# Patient Record
Sex: MALE | Race: WHITE | Employment: UNEMPLOYED | URBAN - METROPOLITAN AREA
[De-identification: names, ages, dates, MRNs, and addresses within clinical notes are randomized per-mention and may not be internally consistent; named-entity substitution may affect disease eponyms.]

---

## 2019-03-13 ENCOUNTER — OFFICE VISIT (OUTPATIENT)
Dept: FAMILY MEDICINE CLINIC | Age: 49
End: 2019-03-13

## 2019-03-13 VITALS
BODY MASS INDEX: 37.37 KG/M2 | HEART RATE: 99 BPM | WEIGHT: 261 LBS | TEMPERATURE: 97.9 F | SYSTOLIC BLOOD PRESSURE: 145 MMHG | HEIGHT: 70 IN | DIASTOLIC BLOOD PRESSURE: 114 MMHG | OXYGEN SATURATION: 96 % | RESPIRATION RATE: 16 BRPM

## 2019-03-13 DIAGNOSIS — E78.2 MIXED HYPERLIPIDEMIA: ICD-10-CM

## 2019-03-13 DIAGNOSIS — I10 HYPERTENSION GOAL BP (BLOOD PRESSURE) < 140/90: ICD-10-CM

## 2019-03-13 DIAGNOSIS — F32.A DEPRESSION, UNSPECIFIED DEPRESSION TYPE: ICD-10-CM

## 2019-03-13 DIAGNOSIS — D49.9 PRECANCEROUS LESION: ICD-10-CM

## 2019-03-13 DIAGNOSIS — Z00.00 ROUTINE MEDICAL EXAM: Primary | ICD-10-CM

## 2019-03-13 DIAGNOSIS — R06.83 SNORING: ICD-10-CM

## 2019-03-13 DIAGNOSIS — Z76.89 SLEEP CONCERN: ICD-10-CM

## 2019-03-13 DIAGNOSIS — E66.01 OBESITY, CLASS III, BMI 40-49.9 (MORBID OBESITY) (HCC): ICD-10-CM

## 2019-03-13 RX ORDER — PIMECROLIMUS 10 MG/G
1 CREAM TOPICAL 2 TIMES DAILY
COMMUNITY
End: 2019-11-26

## 2019-03-13 RX ORDER — AMLODIPINE BESYLATE 5 MG/1
5 TABLET ORAL DAILY
Qty: 30 TAB | Refills: 0 | Status: SHIPPED | OUTPATIENT
Start: 2019-03-13 | End: 2019-04-04 | Stop reason: SDUPTHER

## 2019-03-13 NOTE — PROGRESS NOTES
Subjective:     Jhonny Sterling is a 46 y.o. male presenting for annual exam and complete physical.    Patient Active Problem List   Diagnosis Code    Obesity, Class III, BMI 40-49.9 (morbid obesity) ([facility] E66.01    Hypertension goal BP (blood pressure) < 140/90 I10    Hyperlipidemia E78.5    Depression F32.9    Alcohol abuse F10.10    Precancerous lesion D49.9     Patient Active Problem List    Diagnosis Date Noted    Precancerous lesion 03/13/2019    Obesity, Class III, BMI 40-49.9 (morbid obesity) ([facility] 03/14/2014    Hypertension goal BP (blood pressure) < 140/90 03/14/2014    Hyperlipidemia 03/14/2014    Depression 03/14/2014    Alcohol abuse 03/14/2014     Current Outpatient Medications   Medication Sig Dispense Refill    losartan 50 mg tab 50 mg, hydroCHLOROthiazide 12.5 mg cap 12.5 mg Take  by mouth daily.  pimecrolimus (ELIDEL) 1 % topical cream Apply 1 Tube to affected area two (2) times a day.  amLODIPine (NORVASC) 5 mg tablet Take 1 Tab by mouth daily. 30 Tab 0    DULoxetine (CYMBALTA) 60 mg capsule TAKE 1 CAPSULE BY MOUTH DAILY. 90 Cap 0    traZODone (DESYREL) 100 mg tablet Take 1 Tab by mouth nightly. 30 Tab 0    pravastatin (PRAVACHOL) 40 mg tablet Take 1 Tab by mouth nightly. 30 Tab 0    metoprolol (LOPRESSOR) 100 mg tablet Take 1 Tab by mouth two (2) times a day. (Patient taking differently: Take 50 mg by mouth two (2) times a day.) 60 Tab 0    buPROPion XL (WELLBUTRIN XL) 300 mg XL tablet Take 1 Tab by mouth every morning. 30 Tab 0    aspirin delayed-release 81 mg tablet Take 1 Tab by mouth daily.  90 Tab 3     Allergies   Allergen Reactions    Lisinopril Cough     Past Medical History:   Diagnosis Date    Depression     Hypercholesterolemia     Hypertension     Hypertension goal BP (blood pressure) < 140/90 3/14/2014    Obesity, Class III, BMI 40-49.9 (morbid obesity) ([facility] 3/14/2014     Past Surgical History:   Procedure Laterality Date    HX COLONOSCOPY Bilateral 2018    Dr. Alejandrina Ferreira      Family History   Problem Relation Age of Onset    Hypertension Mother     Arthritis-osteo Mother     Cancer Father         pancreatic    Heart Disease Father 61        [facility]   [employer] Arthritis-rheumatoid Father     Hypertension Sister     Hypertension Brother     Heart Disease Brother 48        [facility]     Social History     Tobacco Use    Smoking status: Never Smoker    Smokeless tobacco: Never Used   Substance Use Topics    Alcohol use: Yes     Alcohol/week: 10.0 oz     Types: 20 drink(s) per week     Comment: 4-5 days a week        Lab Results   Component Value Date/Time    WBC 11.7 03/18/2016 11:48 AM    HGB 15.1 03/18/2016 11:48 AM    HCT 45.4 03/18/2016 11:48 AM    PLATELET 632 20/54/7154 11:48 AM    MCV 93.8 03/18/2016 11:48 AM     Lab Results   Component Value Date/Time    Sodium 137 03/18/2016 11:48 AM    Potassium 4.6 03/18/2016 11:48 AM    Chloride 100 03/18/2016 11:48 AM    CO2 28 03/18/2016 11:48 AM    Anion gap 9 03/18/2016 11:48 AM    Glucose 115 (H) 03/18/2016 11:48 AM    BUN 18 03/18/2016 11:48 AM    Creatinine 1.40 (H) 03/18/2016 11:48 AM    BUN/Creatinine ratio 13 03/18/2016 11:48 AM    GFR est AA >60 03/18/2016 11:48 AM    GFR est non-AA 54 (L) 03/18/2016 11:48 AM    Calcium 9.7 03/18/2016 11:48 AM    Bilirubin, total 0.6 03/18/2016 11:48 AM    ALT (SGPT) 44 03/18/2016 11:48 AM    AST (SGOT) 34 03/18/2016 11:48 AM    Alk. phosphatase 99 03/18/2016 11:48 AM    Protein, total 8.3 (H) 03/18/2016 11:48 AM    Albumin 4.5 03/18/2016 11:48 AM    Globulin 3.8 03/18/2016 11:48 AM    A-G Ratio 1.2 03/18/2016 11:48 AM      Lab Results   Component Value Date/Time    Hemoglobin A1c 6.1 (H) 03/18/2016 11:48 AM         Review of Systems  Review of Systems   Constitutional: Positive for malaise/fatigue. HENT: Negative. Last Dental exam: 10/18   Eyes: Negative. Last eye exam: 12/18  Wears corrective lenses   Respiratory: Negative. Cardiovascular: Negative. Gastrointestinal: Positive for blood in stool. Blood in stool: GI Dr. Migel Garcia is aware   Genitourinary: Negative. No problems with genitalia   Musculoskeletal: Negative. Skin: Negative. Forehead skin dryness: see dermatology   Neurological: Negative. Endo/Heme/Allergies: Negative for polydipsia. Psychiatric/Behavioral: Positive for depression. Negative for suicidal ideas. The patient has insomnia. Hypertension:   Patient reports taking medications as instructed. yes   No medication side effects noted. no  Headache upon wakening. no   Home BP monitoring in range of does not check    No results found for: CREAU, MCAU2, MCACR    Obesity  Not actively trying to lose weight    Depression  On meds  Still some depression    HLD:  Has been compliant with meds yes  Compliant with low-fat diet. no    Denies myalgias or other side effects. no        Objective:     Visit Vitals  BP (!) 145/114 Comment: recheck at end of visit   Pulse 99   Temp 97.9 °F (36.6 °C) (Oral)   Resp 16   Ht 5' 10\" (1.778 m)   Wt 261 lb (118.4 kg)   SpO2 96%   BMI 37.45 kg/m²     Physical exam:   Physical Exam   Constitutional: He is oriented to person, place, and time and well-developed, well-nourished, and in no distress. Vital signs are normal.   HENT:   Head: Normocephalic and atraumatic. Right Ear: Hearing, tympanic membrane, external ear and ear canal normal.   Left Ear: Hearing, tympanic membrane, external ear and ear canal normal.   Nose: Nose normal.   Mouth/Throat: Uvula is midline, oropharynx is clear and moist and mucous membranes are normal.   Eyes: Conjunctivae, EOM and lids are normal. Pupils are equal, round, and reactive to light. Neck: Trachea normal and normal range of motion. Neck supple. No JVD present. Carotid bruit is not present. No tracheal deviation present. No thyroid mass and no thyromegaly present.    Cardiovascular: Normal rate, regular rhythm, S1 normal, S2 normal, normal heart sounds and intact distal pulses. Pulmonary/Chest: Effort normal and breath sounds normal.   Abdominal: Soft. Normal appearance and bowel sounds are normal. There is no tenderness. There is no CVA tenderness. Musculoskeletal: Normal range of motion. Lymphadenopathy:        Head (right side): No submental, no submandibular, no tonsillar, no preauricular, no posterior auricular and no occipital adenopathy present. Head (left side): No submental, no submandibular, no tonsillar, no preauricular, no posterior auricular and no occipital adenopathy present. He has no cervical adenopathy. Neurological: He is alert and oriented to person, place, and time. He has normal motor skills. Gait normal.   Skin: Skin is warm and dry. Psychiatric: Mood, memory, affect and judgment normal.   Nursing note and vitals reviewed. Assessment/Plan:       lose weight, increase physical activity, limit alcohol consumption, follow low fat diet, follow low salt diet, routine labs ordered. ICD-10-CM ICD-9-CM     1. Routine medical exam Z00.00 V70.0 HEMOGLOBIN A1C WITH EAG Here today to establish care. Prior patient of [facility]. Last visit there was 3/2016      CBC W/O DIFF    2. Obesity, Class III, BMI 40-49.9 (morbid obesity) ([facility]) E66.01 278.01  Discussed the patient's BMI with him. The BMI follow up plan is as follows:     dietary management education, guidance, and counseling  encourage exercise  monitor weight  prescribed dietary intake  Discussed portion control  Eat fresh or frozen fruits and vegetables, stay away from canned  Limit eating out and fast food  Practice meal planning     3. Precancerous lesion D49.9 239.9  Polyp on colonoscopy   4. Mixed hyperlipidemia E78.2 272.2 LIPID PANEL    5. Hypertension goal BP (blood pressure) < 140/90 D04 402.2 METABOLIC PANEL, COMPREHENSIVE Blood pressure elevated 145/114  Add norvasc      amLODIPine (NORVASC) 5 mg tablet    6.  Depression, unspecified depression type F32.9 311 TSH 3RD GENERATION Continue current medications  Check thyroid   7. Snoring R06.83 786.09 REFERRAL TO SLEEP STUDIES Evaluate for ALF   8. Sleep concern Z76.89 V69.4 REFERRAL TO SLEEP STUDIES    . An After Visit Summary was printed and given to the patient. All diagnosis have been discussed with the patient and all of the patient's questions have been answered. Follow-up Disposition:  Return in about 4 weeks (around 4/10/2019) for hypertension/ f/u. Janeen Retana, [facility] [id number] [address]   [email or web address], [place] [id number]

## 2019-03-13 NOTE — PATIENT INSTRUCTIONS
Home Blood Pressure Test: About This Test  What is it? A home blood pressure test allows you to keep track of your blood pressure at home. Blood pressure is a measure of the force of blood against the walls of your arteries. Blood pressure readings include two numbers, such as 130/80 (say \"130 over 80\"). The first number is the systolic pressure. The second number is the diastolic pressure. Why is this test done? You may do this test at home to:  · Find out if you have high blood pressure. · Track your blood pressure if you have high blood pressure. · Track how well medicine is working to reduce high blood pressure. · Check how lifestyle changes, such as weight loss and exercise, are affecting blood pressure. How can you prepare for the test?  · Do not use caffeine, tobacco, or medicines known to raise blood pressure (such as nasal decongestant sprays) for at least 30 minutes before taking your blood pressure. · Do not exercise for at least 30 minutes before taking your blood pressure. What happens before the test?  Take your blood pressure while you feel comfortable and relaxed. Sit quietly with both feet on the floor for at least 5 minutes before the test.  What happens during the test?  · Sit with your arm slightly bent and resting on a table so that your upper arm is at the same level as your heart. · Roll up your sleeve or take off your shirt to expose your upper arm. · Wrap the blood pressure cuff around your upper arm so that the lower edge of the cuff is about 1 inch above the bend of your elbow. Proceed with the following steps depending on if you are using an automatic or manual pressure monitor. Automatic blood pressure monitors  · Press the on/off button on the automatic monitor and wait until the ready-to-measure \"heart\" symbol appears next to zero in the display window. · Press the start button. The cuff will inflate and deflate by itself.   · Your blood pressure numbers will appear on the screen. · Write your numbers in your log book, along with the date and time. Manual blood pressure monitors  · Place the earpieces of a stethoscope in your ears, and place the bell of the stethoscope over the artery, just below the cuff. · Close the valve on the rubber inflating bulb. · Squeeze the bulb rapidly with your opposite hand to inflate the cuff until the dial or column of mercury reads about 30 mm Hg higher than your usual systolic pressure. If you do not know your usual pressure, inflate the cuff to 210 mm Hg or until the pulse at your wrist disappears. · Open the pressure valve just slightly by twisting or pressing the valve on the bulb. · As you watch the pressure slowly fall, note the level on the dial at which you first start to hear a pulsing or tapping sound through the stethoscope. This is your systolic blood pressure. · Continue letting the air out slowly. The sounds will become muffled and will finally disappear. Note the pressure when the sounds completely disappear. This is your diastolic blood pressure. Let out all the remaining air. · Write your numbers in your log book, along with the date and time. What else should you know about the test?  It is more accurate to take the average of several readings made throughout the day than to rely on a single reading. It's normal for blood pressure to go up and down throughout the day. Follow-up care is a key part of your treatment and safety. Be sure to make and go to all appointments, and call your doctor if you are having problems. It's also a good idea to keep a list of the medicines you take. Where can you learn more? Go to http://yoko-filipe.info/. Enter C427 in the search box to learn more about \"Home Blood Pressure Test: About This Test.\"  Current as of: July 22, 2018  Content Version: 11.9  © 9660-7969 [employer].  Care instructions adapted under license by [employer] (which disclaims liability or warranty for this information). If you have questions about a medical condition or this instruction, always ask your healthcare professional. [address] any warranty or liability for your use of this information. Body Mass Index: Care Instructions  Your Care Instructions    Body mass index (BMI) can help you see if your weight is raising your risk for health problems. It uses a formula to compare how much you weigh with how tall you are. · A BMI lower than 18.5 is considered underweight. · A BMI between 18.5 and 24.9 is considered healthy. · A BMI between 25 and 29.9 is considered overweight. A BMI of 30 or higher is considered obese. If your BMI is in the normal range, it means that you have a lower risk for weight-related health problems. If your BMI is in the overweight or obese range, you may be at increased risk for weight-related health problems, such as high blood pressure, heart disease, stroke, arthritis or joint pain, and diabetes. If your BMI is in the underweight range, you may be at increased risk for health problems such as fatigue, lower protection (immunity) against illness, muscle loss, bone loss, hair loss, and hormone problems. BMI is just one measure of your risk for weight-related health problems. You may be at higher risk for health problems if you are not active, you eat an unhealthy diet, or you drink too much alcohol or use tobacco products. Follow-up care is a key part of your treatment and safety. Be sure to make and go to all appointments, and call your doctor if you are having problems. It's also a good idea to know your test results and keep a list of the medicines you take. How can you care for yourself at home? · Practice healthy eating habits. This includes eating plenty of fruits, vegetables, whole grains, lean protein, and low-fat dairy. · If your doctor recommends it, get more exercise. Walking is a good choice.  Bit by bit, increase the amount you walk every day. Try for at least 30 minutes on most days of the week. · Do not smoke. Smoking can increase your risk for health problems. If you need help quitting, talk to your doctor about stop-smoking programs and medicines. These can increase your chances of quitting for good. · Limit alcohol to 2 drinks a day for men and 1 drink a day for women. Too much alcohol can cause health problems. If you have a BMI higher than 25  · Your doctor may do other tests to check your risk for weight-related health problems. This may include measuring the distance around your waist. A waist measurement of more than 40 inches in men or 35 inches in women can increase the risk of weight-related health problems. · Talk with your doctor about steps you can take to stay healthy or improve your health. You may need to make lifestyle changes to lose weight and stay healthy, such as changing your diet and getting regular exercise. If you have a BMI lower than 18.5  · Your doctor may do other tests to check your risk for health problems. · Talk with your doctor about steps you can take to stay healthy or improve your health. You may need to make lifestyle changes to gain or maintain weight and stay healthy, such as getting more healthy foods in your diet and doing exercises to build muscle. Where can you learn more? Go to http://yoko-filipe.info/. Enter S176 in the search box to learn more about \"Body Mass Index: Care Instructions. \"  Current as of: June 25, 2018  Content Version: 11.9  © 5140-1086 [employer]. Care instructions adapted under license by [employer] (which disclaims liability or warranty for this information). If you have questions about a medical condition or this instruction, always ask your healthcare professional. [place] [address] any warranty or liability for your use of this information.          Learning About Low-Carbohydrate Diets for Weight Loss  What is a low-carbohydrate diet? Low-carb diets avoid foods that are high in carbohydrate. These high-carb foods include pasta, bread, rice, cereal, fruits, and starchy vegetables. Instead, these diets usually have you eat foods that are high in fat and protein. Many people lose weight quickly on a low-carb diet. But the early weight loss is water. People on this diet often gain the weight back after they start eating carbs again. Not all diet plans are safe or work well. A lot of the evidence shows that low-carb diets aren't healthy. That's because these diets often don't include healthy foods like fruits and vegetables. Losing weight safely means balancing protein, fat, and carbs with every meal and snack. And low-carb diets don't always provide the vitamins, minerals, and fiber you need. If you have a serious medical condition, talk to your doctor before you try any diet. These conditions include kidney disease, heart disease, type 2 diabetes, high cholesterol, and high blood pressure. If you are pregnant, it may not be safe for your baby if you are on a low-carb diet. How can you lose weight safely? You might have heard that a diet plan helped another person lose weight. But that doesn't mean that it will work for you. It is very hard to stay on a diet that includes lots of big changes in your eating habits. If you want to get to a healthy weight and stay there, making healthy lifestyle changes will often work better than dieting. These steps can help. · Make a plan for change. Work with your doctor to create a plan that is right for you. · See a dietitian. He or she can show you how to make healthy changes in your eating habits. · Manage stress. If you have a lot of stress in your life, it can be hard to focus on making healthy changes to your daily habits. · Track your food and activity.  You are likely to do better at losing weight if you keep track of what you eat and what you do. Follow-up care is a key part of your treatment and safety. Be sure to make and go to all appointments, and call your doctor if you are having problems. It's also a good idea to know your test results and keep a list of the medicines you take. Where can you learn more? Go to http://yoko-filipe.info/. Enter A121 in the search box to learn more about \"Learning About Low-Carbohydrate Diets for Weight Loss. \"  Current as of: March 28, 2018  Content Version: 11.9  © 5312-3865 [employer]. Care instructions adapted under license by [employer] (which disclaims liability or warranty for this information). If you have questions about a medical condition or this instruction, always ask your healthcare professional. [address] any warranty or liability for your use of this information.

## 2019-03-14 LAB
ABSOLUTE BANDS, 67058: ABNORMAL
ABSOLUTE BLASTS: ABNORMAL
ABSOLUTE METAMYELOCYTES, 900360: ABNORMAL
ABSOLUTE MYELOCYTES: ABNORMAL
ABSOLUTE NRBC,ANRBC: ABNORMAL
ABSOLUTE PROMYELOCYTES: ABNORMAL
ALB/GLOBRATIO, 58C: 1.5 (CALC) (ref 1–2.5)
ALBUMIN SERPL-MCNC: 4.7 G/DL (ref 3.6–5.1)
ALP SERPL-CCNC: 139 U/L (ref 40–115)
ALT SERPL-CCNC: 87 U/L (ref 9–46)
AST SERPL W P-5'-P-CCNC: 104 U/L (ref 10–35)
BANDS,BANDS: ABNORMAL
BASOPHILS # BLD: 75 CELLS/UL (ref 0–200)
BASOPHILS NFR BLD: 0.7 %
BILIRUB SERPL-MCNC: 0.7 MG/DL (ref 0.2–1.2)
BLASTS,BLAST: ABNORMAL
BUN SERPL-MCNC: 16 MG/DL (ref 7–25)
BUN/CREATININE RATIO,BUCR: ABNORMAL (CALC) (ref 6–22)
CALCIUM SERPL-MCNC: 10.2 MG/DL (ref 8.6–10.3)
CHLORIDE SERPL-SCNC: 98 MMOL/L (ref 98–110)
CHOL/HDL RATIO,CHHDX: 3.6 (CALC)
CHOLEST SERPL-MCNC: 239 MG/DL
CO2 SERPL-SCNC: 27 MMOL/L (ref 20–32)
COMMENT(S): ABNORMAL
CREAT SERPL-MCNC: 0.91 MG/DL (ref 0.7–1.33)
EAG (MG/DL),9916804: 128 (CALC)
EAG (MMOL/L),9916805: 7.1 (CALC)
EOSINOPHIL # BLD: 75 CELLS/UL (ref 15–500)
EOSINOPHIL NFR BLD: 0.7 %
ERYTHROCYTE [DISTWIDTH] IN BLOOD BY AUTOMATED COUNT: 13.1 % (ref 11–15)
GLOBULIN,GLOB: 3.2 G/DL (CALC) (ref 1.9–3.7)
GLUCOSE SERPL-MCNC: 136 MG/DL (ref 65–99)
HBA1C MFR BLD HPLC: 6.1 % OF TOTAL HGB
HCT VFR BLD AUTO: 44.1 % (ref 38.5–50)
HDLC SERPL-MCNC: 66 MG/DL
HGB BLD-MCNC: 15.1 G/DL (ref 13.2–17.1)
LDL-CHOLESTEROL: 148 MG/DL (CALC)
LYMPHOCYTES # BLD: 1616 CELLS/UL (ref 850–3900)
LYMPHOCYTES NFR BLD: 15.1 %
MCH RBC QN AUTO: 32.3 PG (ref 27–33)
MCHC RBC AUTO-ENTMCNC: 34.2 G/DL (ref 32–36)
MCV RBC AUTO: 94.2 FL (ref 80–100)
METAMYELOCYTES,METAS: ABNORMAL
MONOCYTES # BLD: 599 CELLS/UL (ref 200–950)
MONOCYTES NFR BLD: 5.6 %
MYELOCYTES,MYELO: ABNORMAL
NEUTROPHILS # BLD AUTO: 8335 CELLS/UL (ref 1500–7800)
NEUTROPHILS # BLD: 77.9 %
NON-HDL CHOLESTEROL, 011976: 173 MG/DL (CALC)
NRBC: ABNORMAL
PLATELET # BLD AUTO: 229 THOUSAND/UL (ref 140–400)
PMV BLD AUTO: 10.3 FL (ref 7.5–12.5)
POTASSIUM SERPL-SCNC: 4.8 MMOL/L (ref 3.5–5.3)
PROMYELOCYTES,PRO: ABNORMAL
PROT SERPL-MCNC: 7.9 G/DL (ref 6.1–8.1)
RBC # BLD AUTO: 4.68 MILLION/UL (ref 4.2–5.8)
REACTIVE LYMPHS: ABNORMAL
SODIUM SERPL-SCNC: 139 MMOL/L (ref 135–146)
TRIGL SERPL-MCNC: 125 MG/DL (ref ?–150)
TSH SERPL DL<=0.005 MIU/L-ACNC: 3.06 MIU/L (ref 0.4–4.5)
WBC # BLD AUTO: 10.7 THOUSAND/UL (ref 3.8–10.8)

## 2019-03-25 RX ORDER — TRAZODONE HYDROCHLORIDE 100 MG/1
100 TABLET ORAL
Qty: 90 TAB | Refills: 0 | Status: SHIPPED | OUTPATIENT
Start: 2019-03-25 | End: 2019-06-18 | Stop reason: SDUPTHER

## 2019-04-04 DIAGNOSIS — I10 HYPERTENSION GOAL BP (BLOOD PRESSURE) < 140/90: ICD-10-CM

## 2019-04-04 RX ORDER — AMLODIPINE BESYLATE 5 MG/1
TABLET ORAL
Qty: 30 TAB | Refills: 0 | Status: SHIPPED | OUTPATIENT
Start: 2019-04-04 | End: 2019-04-17

## 2019-04-17 ENCOUNTER — OFFICE VISIT (OUTPATIENT)
Dept: FAMILY MEDICINE CLINIC | Age: 49
End: 2019-04-17

## 2019-04-17 VITALS
BODY MASS INDEX: 35.93 KG/M2 | RESPIRATION RATE: 16 BRPM | TEMPERATURE: 97.5 F | WEIGHT: 251 LBS | HEIGHT: 70 IN | HEART RATE: 85 BPM | OXYGEN SATURATION: 98 % | SYSTOLIC BLOOD PRESSURE: 127 MMHG | DIASTOLIC BLOOD PRESSURE: 84 MMHG

## 2019-04-17 DIAGNOSIS — F32.1 CURRENT MODERATE EPISODE OF MAJOR DEPRESSIVE DISORDER, UNSPECIFIED WHETHER RECURRENT (HCC): ICD-10-CM

## 2019-04-17 DIAGNOSIS — E78.00 PURE HYPERCHOLESTEROLEMIA: ICD-10-CM

## 2019-04-17 DIAGNOSIS — Z23 ENCOUNTER FOR IMMUNIZATION: ICD-10-CM

## 2019-04-17 DIAGNOSIS — I10 HYPERTENSION GOAL BP (BLOOD PRESSURE) < 140/90: Primary | ICD-10-CM

## 2019-04-17 DIAGNOSIS — E66.01 OBESITY, CLASS III, BMI 40-49.9 (MORBID OBESITY) (HCC): ICD-10-CM

## 2019-04-17 PROBLEM — E66.9 OBESITY (BMI 30-39.9): Status: ACTIVE | Noted: 2019-04-17

## 2019-04-17 RX ORDER — LOSARTAN POTASSIUM AND HYDROCHLOROTHIAZIDE 12.5; 5 MG/1; MG/1
1 TABLET ORAL DAILY
Qty: 90 TAB | Refills: 1 | Status: SHIPPED | OUTPATIENT
Start: 2019-04-17 | End: 2019-10-08 | Stop reason: SDUPTHER

## 2019-04-17 RX ORDER — BUPROPION HYDROCHLORIDE 150 MG/1
TABLET ORAL
COMMUNITY
Start: 2019-04-08 | End: 2019-04-17

## 2019-04-17 RX ORDER — PRAVASTATIN SODIUM 40 MG/1
40 TABLET ORAL
Qty: 90 TAB | Refills: 1 | Status: SHIPPED | OUTPATIENT
Start: 2019-04-17 | End: 2019-11-08 | Stop reason: SDUPTHER

## 2019-04-17 RX ORDER — AMLODIPINE BESYLATE 5 MG/1
5 TABLET ORAL DAILY
Qty: 90 TAB | Refills: 1 | Status: SHIPPED | OUTPATIENT
Start: 2019-04-17 | End: 2019-10-08 | Stop reason: SDUPTHER

## 2019-04-17 RX ORDER — ESCITALOPRAM OXALATE 20 MG/1
20 TABLET ORAL DAILY
Qty: 30 TAB | Refills: 0 | Status: SHIPPED | OUTPATIENT
Start: 2019-04-17 | End: 2019-11-26

## 2019-04-17 RX ORDER — OXYMETAZOLINE HYDROCHLORIDE 1 G/100G
CREAM TOPICAL
COMMUNITY
Start: 2019-03-27 | End: 2019-11-26

## 2019-04-17 RX ORDER — BUPROPION HYDROCHLORIDE 300 MG/1
300 TABLET ORAL
Qty: 90 TAB | Refills: 1 | Status: SHIPPED | OUTPATIENT
Start: 2019-04-17 | End: 2019-11-08 | Stop reason: SDUPTHER

## 2019-04-17 RX ORDER — METOPROLOL TARTRATE 100 MG/1
100 TABLET ORAL 2 TIMES DAILY
Qty: 180 TAB | Refills: 1 | Status: SHIPPED | OUTPATIENT
Start: 2019-04-17 | End: 2019-10-08 | Stop reason: SDUPTHER

## 2019-04-17 NOTE — PATIENT INSTRUCTIONS
Taper off cymbalta Take 60mg every other day for 7 days, then every 3 days for 7 days, then stop If you experience any discontinuation symptoms slow the taper DISCONTINUATION SYMPTOMS  Abruptly stopping or rapidly tapering antidepressants often causes adverse effects, especially if a new drug with overlapping pharmacodynamic activity is not started. The constellation of discontinuation effects, sometimes referred to as a \"discontinuation syndrome,\" has been best characterized in patients who abruptly stop selective serotonin reuptake inhibitors (SSRIs). The most common discontinuation symptoms include [1-4]: ?Dizziness ? Fatigue ? Headache ? Nausea Other common discontinuation symptoms include [1-4]: ?Agitation ? Anxiety ? Chills ? Diaphoresis ? Dysphoria ? Insomnia ? Irritability ? Myalgias ? Paresthesias ? Rhinorrhea ? Tremor Less common symptoms include electric-like shocks [3], ataxia [1,3], auditory and visual hallucinations [5,6], and hypertension [7]. Onset of discontinuation symptoms typically occurs within a few (eg, one to four) days of abruptly stopping antidepressants or tapering them rapidly (eg, one to seven days) [1,4,5,8-11]. Although symptoms are usually mild and dissipate over one to two weeks without specific treatment, distressing symptoms can persist for a month or longer, interfere with functioning, and may rarely lead to hospitalization.

## 2019-04-17 NOTE — PROGRESS NOTES
After obtaining consent, and per orders of MANOJ Gutierrez NP injection of  TDAP 0.5ml to left deltoid given by Ruiz Patel. Patient instructed to remain in clinic for 20 minutes afterwards, and to report any adverse reaction to me immediately. Patient small amount bleeding present after the injection. Apply pressure for 10 mins and clean area and cover bandage. Patient tolerated injection.

## 2019-04-17 NOTE — PROGRESS NOTES
1. Have you been to the ER, urgent care clinic since your last visit? Hospitalized since your last visit? No 
 
2. Have you seen or consulted any other health care providers outside of the [address] [place] [address] since your last visit? Include any pap smears or colon screening. No  
 
Chief Complaint Patient presents with  Follow-up HTN Visit Vitals /84 (BP 1 Location: Left arm, BP Patient Position: At rest) Pulse 85 Temp 97.5 °F (36.4 °C) Resp 16 Ht 5' 10\" (1.778 m) Wt 251 lb (113.9 kg) SpO2 98% BMI 36.01 kg/m²

## 2019-04-17 NOTE — PROGRESS NOTES
[address] 
             [phone] Amado Gaitan is a 46 y.o. male and presents with Follow-up ( HTN) Subjective: Hypertension: 
 Patient reports taking medications as instructed. yes Medication side effects noted. no Headache upon wakening. no 
 Home BP monitoring in range of  Does not do No results found for: Jose Wenceslao, MCACR Depression No particular trigger in his life Has been a chronic problem since 19years old Denies SI/HI 
 
 
ROS: 
As stated in HPI, otherwise negative. ROS The problem list was updated as a part of today's visit. Patient Active Problem List  
Diagnosis Code  Hypertension goal BP (blood pressure) < 140/90 I10  Hyperlipidemia E78.5  Depression F32.9  Alcohol abuse F10.10  Precancerous lesion D49.9  Severe obesity (HCC) E66.01 The PSH, FH were reviewed. SH: Social History Tobacco Use  Smoking status: Never Smoker  Smokeless tobacco: Never Used Substance Use Topics  Alcohol use: Yes Alcohol/week: 10.0 oz Types: 20 drink(s) per week Comment: 4-5 days a week  Drug use: No  
 
 
Medications/Allergies: 
Current Outpatient Medications on File Prior to Visit Medication Sig Dispense Refill  traZODone (DESYREL) 100 mg tablet Take 1 Tab by mouth nightly. 90 Tab 0  pimecrolimus (ELIDEL) 1 % topical cream Apply 1 Tube to affected area two (2) times a day.  aspirin delayed-release 81 mg tablet Take 1 Tab by mouth daily. 90 Tab 3  
 RHOFADE 1 % crea No current facility-administered medications on file prior to visit. Allergies Allergen Reactions  Lisinopril Cough Objective: 
Visit Vitals /84 (BP 1 Location: Left arm, BP Patient Position: At rest) Pulse 85 Temp 97.5 °F (36.4 °C) Resp 16 Ht 5' 10\" (1.778 m) Wt 251 lb (113.9 kg) SpO2 98% BMI 36.01 kg/m² Body mass index is 36.01 kg/m². Physical assessment Physical Exam  
Constitutional: He is oriented to person, place, and time and well-developed, well-nourished, and in no distress. Neck: Normal range of motion. No JVD present. Cardiovascular: Normal rate, regular rhythm, normal heart sounds and intact distal pulses. Exam reveals no gallop and no friction rub. No murmur heard. Pulmonary/Chest: Effort normal and breath sounds normal.  
Neurological: He is alert and oriented to person, place, and time. Skin: Skin is warm and dry. Psychiatric: Mood, memory and judgment normal. He has a flat affect. Nursing note and vitals reviewed. 3 most recent PHQ Screens 4/17/2019 PHQ Not Done Medical Reason (indicate in comments) Little interest or pleasure in doing things Several days Feeling down, depressed, irritable, or hopeless More than half the days Total Score PHQ 2 3 Trouble falling or staying asleep, or sleeping too much Several days Feeling tired or having little energy More than half the days Poor appetite, weight loss, or overeating More than half the days Feeling bad about yourself - or that you are a failure or have let yourself or your family down More than half the days Trouble concentrating on things such as school, work, reading, or watching TV More than half the days Moving or speaking so slowly that other people could have noticed; or the opposite being so fidgety that others notice Not at all Thoughts of being better off dead, or hurting yourself in some way Not at all PHQ 9 Score 12 How difficult have these problems made it for you to do your work, take care of your home and get along with others Somewhat difficult Labwork and Ancillary Studies: CBC w/Diff Lab Results Component Value Date/Time WBC 10.7 03/13/2019 12:42 PM  
 HGB 15.1 03/13/2019 12:42 PM  
 PLATELET 282 24/19/1555 12:42 PM  
  
 
 Basic Metabolic Profile Lab Results Component Value Date/Time Sodium 139 03/13/2019 12:42 PM  
 Potassium 4.8 03/13/2019 12:42 PM  
 Chloride 98 03/13/2019 12:42 PM  
 CO2 27 03/13/2019 12:42 PM  
 Anion gap 9 03/18/2016 11:48 AM  
 Glucose 136 (H) 03/13/2019 12:42 PM  
 BUN 16 03/13/2019 12:42 PM  
 Creatinine 0.91 03/13/2019 12:42 PM  
 BUN/Creatinine ratio NOT APPLICABLE 62/17/9756 80:39 PM  
 GFR est  03/13/2019 12:42 PM  
 GFR est non-AA 98 03/13/2019 12:42 PM  
 Calcium 10.2 03/13/2019 12:42 PM  
  
 
Cholesterol Lab Results Component Value Date/Time Cholesterol, total 239 (H) 03/13/2019 12:42 PM  
 HDL Cholesterol 66 03/13/2019 12:42 PM  
 LDL-CHOLESTEROL 148 (H) 03/13/2019 12:42 PM  
 LDL, calculated 123.2 (H) 03/18/2016 11:48 AM  
 Triglyceride 125 03/13/2019 12:42 PM  
 CHOL/HDL Ratio 3.2 03/18/2016 11:48 AM  
 Cholesterol/HDL ratio 3.6 03/13/2019 12:42 PM  
 
 
Assessment/Plan:   
Diagnoses and all orders for this visit: 
 
1. Hypertension goal BP (blood pressure) < 140/90 
-     amLODIPine (NORVASC) 5 mg tablet; Take 1 Tab by mouth daily. -     losartan-hydroCHLOROthiazide (HYZAAR) 50-12.5 mg per tablet; Take 1 Tab by mouth daily. -     metoprolol tartrate (LOPRESSOR) 100 mg IR tablet; Take 1 Tab by mouth two (2) times a day. 2. Current moderate episode of major depressive disorder, unspecified whether recurrent (HCC) 
-     escitalopram oxalate (LEXAPRO) 20 mg tablet; Take 1 Tab by mouth daily. -     buPROPion XL (WELLBUTRIN XL) 300 mg XL tablet; Take 1 Tab by mouth every morning. 3. Pure hypercholesterolemia -     pravastatin (PRAVACHOL) 40 mg tablet; Take 1 Tab by mouth nightly. 4. Obesity, Class III, BMI 40-49.9 (morbid obesity) ([address] Discussed the patient's BMI with him. The BMI follow up plan is as follows:  
 
dietary management education, guidance, and counseling 
encourage exercise 
monitor weight 
prescribed dietary intake Discussed portion control Eat fresh or frozen fruits and vegetables, stay away from canned Limit eating out and fast food Practice meal planning 5. Encounter for immunization -     TETANUS, DIPHTHERIA TOXOIDS AND ACELLULAR PERTUSSIS VACCINE (TDAP), IN INDIVIDS. >=7, IM 
-     THER/PROPH/DIAG INJECTION, SUBCUT/IM Blood pressure better with norvasc, 127/84 in office today Continue medications he has been on. Taper off cymbalta, he is not having a good response and start lexapro Health Maintenance:  
Health Maintenance Topic Date Due  Pneumococcal 0-64 years (1 of 1 - PPSV23) 06/23/1974  Shingrix Vaccine Age 50> (1 of 2) 06/23/2018  Influenza Age 5 to Adult  09/13/2019 (Originally 8/1/2019)  COLONOSCOPY  01/01/2020  
 DTaP/Tdap/Td series (2 - Td) 04/17/2029 I have discussed the diagnosis with the patient and the intended plan as seen in the above orders. The patient has received an After-Visit Summary and questions were answered concerning future plans. An After Visit Summary was printed and given to the patient. All diagnosis have been discussed with the patient and all of the patient's questions have been answered. Follow-up and Dispositions · Return in about 3 months (around 7/17/2019) for hypertension, depression, hyperlipidemia, 15 minutes. [facility] [address] [id number]

## 2019-06-18 RX ORDER — TRAZODONE HYDROCHLORIDE 100 MG/1
TABLET ORAL
Qty: 90 TAB | Refills: 0 | Status: SHIPPED | OUTPATIENT
Start: 2019-06-18 | End: 2019-10-08 | Stop reason: SDUPTHER

## 2019-11-08 ENCOUNTER — OFFICE VISIT (OUTPATIENT)
Dept: FAMILY MEDICINE CLINIC | Age: 49
End: 2019-11-08

## 2019-11-08 VITALS
TEMPERATURE: 97 F | WEIGHT: 261 LBS | DIASTOLIC BLOOD PRESSURE: 109 MMHG | SYSTOLIC BLOOD PRESSURE: 155 MMHG | RESPIRATION RATE: 14 BRPM | OXYGEN SATURATION: 96 % | HEART RATE: 92 BPM | BODY MASS INDEX: 37.37 KG/M2 | HEIGHT: 70 IN

## 2019-11-08 DIAGNOSIS — F32.A DEPRESSION, UNSPECIFIED DEPRESSION TYPE: ICD-10-CM

## 2019-11-08 DIAGNOSIS — I10 ESSENTIAL HYPERTENSION WITH GOAL BLOOD PRESSURE LESS THAN 140/90: Primary | ICD-10-CM

## 2019-11-08 DIAGNOSIS — E78.5 HYPERLIPIDEMIA, UNSPECIFIED HYPERLIPIDEMIA TYPE: ICD-10-CM

## 2019-11-08 DIAGNOSIS — R74.8 ELEVATED LIVER ENZYMES: ICD-10-CM

## 2019-11-08 DIAGNOSIS — Z23 ENCOUNTER FOR IMMUNIZATION: ICD-10-CM

## 2019-11-08 RX ORDER — DULOXETIN HYDROCHLORIDE 60 MG/1
60 CAPSULE, DELAYED RELEASE ORAL DAILY
COMMUNITY
End: 2019-11-08 | Stop reason: SDUPTHER

## 2019-11-08 NOTE — PROGRESS NOTES
[facility]    CC: [facility]    HPI:     HTN:  -Last seen for issue on 4/25/2019 by Lieutenant Olvin NP  -Taking BP medication as prescribed  -Denies any side effects or issues with BP medication  -Not logging home BP  -Does not follow a regular exercise regimen  -Thinks recent weight gain is partially responsible for elevated BP today      Depression:  -States it has been an issue his whole life  -Has been on psychiatric medication since he was 23 years  -Not seeing any therapist or counselor  -Last seen for issue on 4/25/2019 by Lieutenant Olvin NP  -Taking Cymbalta, trazodone, and bupropion as prescribed  -Denies any side effects or issues with the medication      HLD:  -Taking statin as prescribed  -Denies any side effects or issues with the medication  -Does not follow a regular exercise regimen      Elevated Liver Enzymes:  -Noted on blood work done on 3/13/2019  -Denies prior Hx of liver disease  -Currently drinks alcohol 3 days a week  -Denies any regular Tylenol      ROS: Positive items marked in RED  CON: fever, chills  Cardiovascular: palpitations, CP  Resp: SOB, cough  GI: nausea, vomiting, diarrhea  : dysuria, hematuria      Past Medical History:   Diagnosis Date    Depression     Hypercholesterolemia     Hypertension     Obesity, Class III, BMI 40-49.9 (morbid obesity) ([facility] [address] 3/14/2014       Past Surgical History:   Procedure Laterality Date    HX COLONOSCOPY Bilateral 2018    Dr. Velia Colby        Family History   Problem Relation Age of Onset    Hypertension Mother     Arthritis-osteo Mother     Cancer Father         pancreatic    Heart Disease Father 61        CABG    Arthritis-rheumatoid Father     Hypertension Sister     Hypertension Brother     Heart Disease Brother 48        CABG       Social History     Socioeconomic History    Marital status: SINGLE     Spouse name: Not on file    Number of children: Not on file    Years of education: Not on file   [facility] education level: Not on file   Tobacco Use    Smoking status: Never Smoker    Smokeless tobacco: Never Used   Substance and Sexual Activity    Alcohol use: Yes     Alcohol/week: 16.7 standard drinks     Types: 20 drink(s) per week     Comment: 4-5 days a week    Drug use: No    Sexual activity: Never       Allergies   Allergen Reactions    Lisinopril Cough         Current Outpatient Medications:     DULoxetine (CYMBALTA) 60 mg capsule, Take 60 mg by mouth daily. , Disp: , Rfl:     traZODone (DESYREL) 100 mg tablet, TAKE 1 TABLET BY MOUTH EVERY DAY AT NIGHT, Disp: 30 Tab, Rfl: 0    amLODIPine (NORVASC) 5 mg tablet, TAKE 1 TABLET BY MOUTH EVERY DAY, Disp: 30 Tab, Rfl: 0    metoprolol tartrate (LOPRESSOR) 100 mg IR tablet, TAKE 1 TABLET BY MOUTH TWICE A DAY, Disp: 60 Tab, Rfl: 0    losartan-hydroCHLOROthiazide (HYZAAR) 50-12.5 mg per tablet, TAKE 1 TABLET BY MOUTH EVERY DAY, Disp: 30 Tab, Rfl: 0    buPROPion XL (WELLBUTRIN XL) 300 mg XL tablet, Take 1 Tab by mouth every morning., Disp: 90 Tab, Rfl: 1    pravastatin (PRAVACHOL) 40 mg tablet, Take 1 Tab by mouth nightly., Disp: 90 Tab, Rfl: 1    aspirin delayed-release 81 mg tablet, Take 1 Tab by mouth daily. , Disp: 90 Tab, Rfl: 3      Physical Exam:      BP (!) 156/110   Pulse 91   Temp 97 °F (36.1 °C) (Oral)   Resp 14   Ht 5' 10\" (1.778 m)   Wt 261 lb (118.4 kg)   SpO2 96%   BMI 37.45 kg/m²     General: obese habitus, NAD, conversant  Eyes: sclera clear bilaterally, no discharge noted, eyelids normal in appearance  HENT: NCAT  Lungs: CTAB, normal respiratory effort and rate  CV: RRR, no MRGs  ABD: soft, non-tender, non-distended, normal bowel sounds  Skin: normal temperature, turgor, color, and texture  Psych: alert and oriented to person, place and situation, normal affect  Neuro: speech normal, moving all extremities, gait normal    Results for Selvin Castillo (MRN [id number]):   Ref.  Range 3/13/2019 12:42   WBC Latest Ref Range: 3.8 - 10.8 Thousand/uL 10.7   RBC Latest Ref Range: 4.20 - 5.80 Million/uL 4.68   HGB Latest Ref Range: 13.2 - 17.1 g/dL 15.1   HCT Latest Ref Range: 38.5 - 50.0 % 44.1   MCV Latest Ref Range: 80.0 - 100.0 fL 94.2   MCH Latest Ref Range: 27.0 - 33.0 pg 32.3   MCHC Latest Ref Range: 32.0 - 36.0 g/dL 34.2   RDW Latest Ref Range: 11.0 - 15.0 % 13.1   PLATELET Latest Ref Range: 140 - 400 Thousand/uL 229   MEAN PLATELET VOLUME Latest Ref Range: 7.5 - 12.5 fL 10.3   LYMPHOCYTES Latest Units: % 15.1   MONOCYTES Latest Units: % 5.6   EOSINOPHILS Latest Units: % 0.7   BASOPHILS Latest Units: % 0.7   ABS. NEUTROPHILS Latest Ref Range: 1,500 - 7,800 cells/uL 8,335 (H)   ABS. LYMPHOCYTES Latest Ref Range: 850 - 3,900 cells/uL 1,616   ABS. MONOCYTES Latest Ref Range: 200 - 950 cells/uL 599   ABS. EOSINOPHILS Latest Ref Range: 15 - 500 cells/uL 75   ABS. BASOPHILS Latest Ref Range: 0 - 200 cells/uL 75   Sodium Latest Ref Range: 135 - 146 mmol/L 139   Potassium Latest Ref Range: 3.5 - 5.3 mmol/L 4.8   Chloride Latest Ref Range: 98 - 110 mmol/L 98   CO2 Latest Ref Range: 20 - 32 mmol/L 27   Glucose Latest Ref Range: 65 - 99 mg/dL 136 (H)   BUN Latest Ref Range: 7 - 25 mg/dL 16   Creatinine Latest Ref Range: 0.70 - 1.33 mg/dL 0.91   BUN/Creatinine ratio Latest Ref Range: 6 - 22 (calc) NOT APPLICABLE   Calcium Latest Ref Range: 8.6 - 10.3 mg/dL 10.2   GFR est non-AA Latest Ref Range: > OR = 60 mL/min/1.73m2 98   GFR est AA Latest Ref Range: > OR = 60 mL/min/1.73m2 113   Bilirubin, total Latest Ref Range: 0.2 - 1.2 mg/dL 0.7   Protein, total Latest Ref Range: 6.1 - 8.1 g/dL 7.9   Albumin Latest Ref Range: 3.6 - 5.1 g/dL 4.7   Globulin Latest Ref Range: 1.9 - 3.7 g/dL (calc) 3.2   ALT (SGPT) Latest Ref Range: 9 - 46 U/L 87 (H)   AST Latest Ref Range: 10 - 35 U/L 104 (H)   Alk.  phosphatase Latest Ref Range: 40 - 115 U/L 139 (H)   Triglyceride Latest Ref Range: <150 mg/dL 125   Cholesterol, total Latest Ref Range: <200 mg/dL 239 (H)   HDL Cholesterol Latest Ref Range: >40 mg/dL 66   Non-HDL Cholesterol Latest Ref Range: <130 mg/dL (calc) 173 (H)   LDL-CHOLESTEROL Latest Units: mg/dL (calc) 148 (H)   Cholesterol/HDL ratio Latest Ref Range: <5.0 (calc) 3.6   Hemoglobin A1c, (calculated) Latest Ref Range: <5.7 % of total Hgb 6.1 (H)   TSH Latest Ref Range: 0.40 - 4.50 mIU/L 3.06       Assessment/Plan     HTN:  -Currently not at goal BP of less than 140/90.  Was notably at goal at last office visit on 4/17/2019.  -Patient will work on recommended lifestyle changes  -Plan to adjust BP medication at next visit, if BP is still not at goal  -F/U in one month      Elevated Liver Enzymes:  -Suspect it is 2/2 alcohol use  -Patient counseled on finding and recommended lifestyle changes  -Hepatic function panel and acute hepatitis panel ordered  -Handout given on hepatitis care  -F/U in one month      HLD:  -10 year ASCVD risk of 5.4% on 3/13/2019  -Will continue current statin regimen  -F/U in one month      Depression, Well Controlled:  -Will continue current medication regimen  -F/U in one month      Health Maintenance:  -Influenza vaccine administered today        Fina Dasilva MD  11/8/2019, 9:43 AM

## 2019-11-08 NOTE — PROGRESS NOTES
1. Have you been to the ER, urgent care clinic since your last visit? Hospitalized since your last visit? No    2. Have you seen or consulted any other health care providers outside of the [address] [place] [address] since your last visit? Include any pap smears or colon screening.  No

## 2019-11-08 NOTE — PATIENT INSTRUCTIONS
Hepatitis: Care Instructions  Your Care Instructions    Hepatitis is inflammation of the liver. It's caused most often by a virus. It can also be caused by heavy drinking over a long time. Certain medicines can make hepatitis worse. When this condition is severe, the liver can't remove waste from the body. Your body also can't do its other jobs as it should. · Hepatitis A is spread by food or water that has the virus. This type doesn't lead to long-term liver problems. · Hepatitis B is spread through infected blood, semen, or other body fluids during sex. It's also spread by sharing needles to inject drugs. Most people who have it get better after 4 to 8 weeks. After you have had this virus, you will not get it again. If it stays in your body for a long time, it can cause serious liver damage. · Hepatitis C is spread by sharing needles to use drugs. It is sometimes spread through infected blood, semen, or other body fluids during sex. Most people who have this virus have a long-term infection. Sometimes it causes severe liver damage. · Hepatitis from alcohol use can lead to severe liver problems. If you stop drinking, your liver most often will get better. · Some medicines can cause liver damage. These include over-the-counter and herbal medicines. The infection most often goes away when you stop taking the medicine. But this may not be the case if serious liver damage has already happened. · Hepatitis also can be caused when the immune system attacks the liver. This is called autoimmune hepatitis. You can help your liver heal--or lower the chance of liver damage--by following your doctor's advice. Follow-up care is a key part of your treatment and safety. Be sure to make and go to all appointments, and call your doctor if you are having problems. It's also a good idea to know your test results and keep a list of the medicines you take. How can you care for yourself at home? · Be safe with medicines. If your doctor prescribes antiviral medicine, take it exactly as directed. Do not stop or change a medicine without talking to your doctor first.  · Lower your activity to match your energy. · Avoid alcohol for as long as your doctor says. Alcohol can make liver problems worse. Tell your doctor if you need help to quit. Counseling, support groups, and sometimes medicines can help you stay sober. · Make sure your doctor knows all the medicines you take. Do not take any new medicines unless your doctor says it is okay. · Follow your doctor's advice about your diet. · If you have itchy skin, keep cool, stay out of the sun. Try to wear cotton clothing. Talk to your doctor about using over-the-counter medicines for itching. These include diphenhydramine (Benadryl) and chlorpheniramine (Chlor-Trimeton). Follow the instructions on the label. To prevent spreading hepatitis B or C  · Tell the people you live with or have sex with about your illness as soon as you can. · Don't donate blood or blood products, organs, semen, or eggs (ova). · Stop all sexual activity or use latex condoms until your doctor tells you that you can no longer give the virus to others. Avoid anal contact with a sex partner while you are infected. · Don't share your personal items. These include razors, toothbrushes, towels, and nail files. · Tell your doctor, dentist, and anyone else who may come in contact with your blood about your illness. · If you are pregnant, tell the doctor who will deliver your baby about your illness. If you have hepatitis B, be sure your baby gets medicine to prevent infection. This should start right after birth. · Clean or carefully get rid of anything that has your blood on it. This includes clothing and sanitary pads. · Make sure to clean surfaces that have your blood or any other body fluid on them. Examples are semen and menstrual blood. Use a solution of bleach and water.  To dilute household bleach, follow the directions on the label. Clean toilet seats, countertops, and floors. To prevent hepatitis A  · Always wash your hands after you use the bathroom. And be sure to wash them before you touch food. · If you have been exposed to someone who may have hepatitis A, ask your doctor about a shot of immune globulin. (This is also called gamma globulin.) It can help your body fight the infection. When should you call for help? Call 911 anytime you think you may need emergency care. For example, call if:    · You passed out (lost consciousness).    Call your doctor now or seek immediate medical care if:    · You have new or worse belly pain.     · You have a new or higher fever.     · You are dizzy or lightheaded, or you feel like you may faint.     · You have symptoms of dehydration, such as:  ? Dry eyes and a dry mouth. ? Passing only a little urine. ? Feeling thirstier than normal.     · You cannot keep down medicine or fluids.     · You have new or more blood in stools.     · You have new or worse vomiting or diarrhea.    Watch closely for changes in your health, and be sure to contact your doctor if:    · You do not get better as expected. Where can you learn more? Go to http://yoko-filipe.info/. Enter 21  in the search box to learn more about \"Hepatitis: Care Instructions. \"  Current as of: June 9, 2019  Content Version: 12.2  © 5097-1848 [employer]. Care instructions adapted under license by [employer] (which disclaims liability or warranty for this information). If you have questions about a medical condition or this instruction, always ask your healthcare professional. [address] any warranty or liability for your use of this information.

## 2019-12-28 DIAGNOSIS — I10 HYPERTENSION GOAL BP (BLOOD PRESSURE) < 140/90: ICD-10-CM

## 2019-12-30 RX ORDER — LOSARTAN POTASSIUM AND HYDROCHLOROTHIAZIDE 12.5; 5 MG/1; MG/1
TABLET ORAL
Qty: 30 TAB | Refills: 0 | Status: SHIPPED | OUTPATIENT
Start: 2019-12-30 | End: 2020-01-20 | Stop reason: SDUPTHER

## 2019-12-30 RX ORDER — AMLODIPINE BESYLATE 5 MG/1
TABLET ORAL
Qty: 30 TAB | Refills: 0 | Status: SHIPPED | OUTPATIENT
Start: 2019-12-30 | End: 2020-01-20 | Stop reason: SDUPTHER

## 2020-01-14 LAB
ALBUMIN SERPL-MCNC: 4.6 G/DL (ref 3.5–5.5)
ALP SERPL-CCNC: 169 IU/L (ref 39–117)
ALT SERPL-CCNC: 78 IU/L (ref 0–44)
AST SERPL-CCNC: 123 IU/L (ref 0–40)
BILIRUB DIRECT SERPL-MCNC: 0.24 MG/DL (ref 0–0.4)
BILIRUB SERPL-MCNC: 0.6 MG/DL (ref 0–1.2)
HAV IGM SERPL QL IA: NEGATIVE
HBV CORE IGM SERPL QL IA: NEGATIVE
HBV SURFACE AG SERPL QL IA: NEGATIVE
HCV AB S/CO SERPL IA: <0.1 S/CO RATIO (ref 0–0.9)
PROT SERPL-MCNC: 7.8 G/DL (ref 6–8.5)

## 2020-01-20 ENCOUNTER — OFFICE VISIT (OUTPATIENT)
Dept: FAMILY MEDICINE CLINIC | Age: 50
End: 2020-01-20

## 2020-01-20 VITALS
BODY MASS INDEX: 35.5 KG/M2 | WEIGHT: 248 LBS | OXYGEN SATURATION: 96 % | TEMPERATURE: 98 F | HEIGHT: 70 IN | DIASTOLIC BLOOD PRESSURE: 89 MMHG | HEART RATE: 81 BPM | RESPIRATION RATE: 16 BRPM | SYSTOLIC BLOOD PRESSURE: 125 MMHG

## 2020-01-20 DIAGNOSIS — E78.5 HYPERLIPIDEMIA, UNSPECIFIED HYPERLIPIDEMIA TYPE: ICD-10-CM

## 2020-01-20 DIAGNOSIS — K75.9 HEPATITIS: Primary | ICD-10-CM

## 2020-01-20 DIAGNOSIS — I10 ESSENTIAL HYPERTENSION WITH GOAL BLOOD PRESSURE LESS THAN 140/90: ICD-10-CM

## 2020-01-20 DIAGNOSIS — F32.A DEPRESSION, UNSPECIFIED DEPRESSION TYPE: ICD-10-CM

## 2020-01-20 RX ORDER — PRAVASTATIN SODIUM 40 MG/1
40 TABLET ORAL
Qty: 30 TAB | Refills: 5 | Status: SHIPPED | OUTPATIENT
Start: 2020-01-20 | End: 2020-11-04 | Stop reason: SDUPTHER

## 2020-01-20 RX ORDER — DULOXETIN HYDROCHLORIDE 60 MG/1
60 CAPSULE, DELAYED RELEASE ORAL DAILY
Qty: 30 CAP | Refills: 5 | Status: SHIPPED | OUTPATIENT
Start: 2020-01-20 | End: 2020-11-05

## 2020-01-20 RX ORDER — LOSARTAN POTASSIUM AND HYDROCHLOROTHIAZIDE 12.5; 5 MG/1; MG/1
TABLET ORAL
Qty: 30 TAB | Refills: 5 | Status: SHIPPED | OUTPATIENT
Start: 2020-01-20 | End: 2020-08-20 | Stop reason: SDUPTHER

## 2020-01-20 RX ORDER — LOSARTAN POTASSIUM AND HYDROCHLOROTHIAZIDE 12.5; 5 MG/1; MG/1
TABLET ORAL
Qty: 30 TAB | Refills: 3 | Status: CANCELLED | OUTPATIENT
Start: 2020-01-20

## 2020-01-20 RX ORDER — BUPROPION HYDROCHLORIDE 300 MG/1
300 TABLET ORAL
Qty: 30 TAB | Refills: 5 | Status: SHIPPED | OUTPATIENT
Start: 2020-01-20 | End: 2020-07-22

## 2020-01-20 RX ORDER — AMLODIPINE BESYLATE 5 MG/1
TABLET ORAL
Qty: 30 TAB | Refills: 5 | Status: SHIPPED | OUTPATIENT
Start: 2020-01-20 | End: 2020-08-20 | Stop reason: SDUPTHER

## 2020-01-20 RX ORDER — METOPROLOL TARTRATE 100 MG/1
TABLET ORAL
Qty: 60 TAB | Refills: 5 | Status: SHIPPED | OUTPATIENT
Start: 2020-01-20 | End: 2020-07-22

## 2020-01-20 RX ORDER — TRAZODONE HYDROCHLORIDE 100 MG/1
TABLET ORAL
Qty: 30 TAB | Refills: 5 | Status: SHIPPED | OUTPATIENT
Start: 2020-01-20 | End: 2020-07-22

## 2020-01-20 NOTE — PROGRESS NOTES
[facility]    CC: Follow-up for Chronic Disease Management    HPI:        HTN:  -Taking blood pressure medications prescribed  -Denies any side effects or issues with blood pressure medication  -Does not check blood pressure at home  -Not following any regular exercise regimen  -Trying to eat better (less processed food and more fruits and vegetables)      Elevated Liver Enzymes:  -Has not been drinking any alcohol  -Got requested lab work  -Denies any associated symptoms       Depresssion:  -Taking psychiatric medication as prescribed  -Denies any side effects or issues with psychiatric medication  -Reports that medication does help with his mood  -Has had acupuction and massage in the past for this issue which did help noticeably      HLD:  -Taking statin as prescribed  -Denies any known side effects or issues to medication  -Not following any regular exercise regimen  -Trying to eat better (less processed food and more fruits and vegetables)      ROS: Positive items marked in RED  CON: fever, chills  Cardiovascular: palpitations, CP  Resp: SOB, cough  GI: nausea, vomiting, diarrhea  : dysuria, hematuria      Past Medical History:   Diagnosis Date    Depression     Hypercholesterolemia     Hypertension     Obesity, Class III, BMI 40-49.9 (morbid obesity) ([facility] [address] 3/14/2014       Past Surgical History:   Procedure Laterality Date    HX COLONOSCOPY Bilateral 2018    Dr. Joey Vega        Family History   Problem Relation Age of Onset    Hypertension Mother     Arthritis-osteo Mother     Cancer Father         pancreatic    Heart Disease Father 61        CABG    Arthritis-rheumatoid Father     Hypertension Sister     Hypertension Brother     Heart Disease Brother 48        CABG       Social History     Socioeconomic History    Marital status: SINGLE     Spouse name: Not on file    Number of children: Not on file    Years of education: Not on file    Highest education level: Not on file Tobacco Use    Smoking status: Never Smoker    Smokeless tobacco: Never Used   Substance and Sexual Activity    Alcohol use: Yes     Alcohol/week: 16.7 standard drinks     Types: 20 drink(s) per week     Comment: 4-5 days a week    Drug use: No    Sexual activity: Never       Allergies   Allergen Reactions    Lisinopril Cough         Current Outpatient Medications:     amLODIPine (NORVASC) 5 mg tablet, TAKE 1 TABLET BY MOUTH EVERY DAY, Disp: 30 Tab, Rfl: 0    losartan-hydroCHLOROthiazide (HYZAAR) 50-12.5 mg per tablet, TAKE 1 TABLET BY MOUTH EVERY DAY, Disp: 30 Tab, Rfl: 0    buPROPion XL (WELLBUTRIN XL) 300 mg XL tablet, Take 1 Tab by mouth every morning., Disp: 30 Tab, Rfl: 1    DULoxetine (CYMBALTA) 60 mg capsule, Take 1 Cap by mouth daily. , Disp: 30 Cap, Rfl: 1    metoprolol tartrate (LOPRESSOR) 100 mg IR tablet, TAKE 1 TABLET BY MOUTH TWICE A DAY, Disp: 60 Tab, Rfl: 1    traZODone (DESYREL) 100 mg tablet, TAKE 1 TABLET BY MOUTH EVERY DAY AT NIGHT, Disp: 30 Tab, Rfl: 1    pravastatin (PRAVACHOL) 40 mg tablet, Take 1 Tab by mouth nightly., Disp: 90 Tab, Rfl: 1    Physical Exam:      /89   Pulse 81   Temp 98 °F (36.7 °C) (Oral)   Resp 16   Ht 5' 10\" (1.778 m)   Wt 248 lb (112.5 kg)   SpO2 96%   BMI 35.58 kg/m²     General: Obese habitus, NAD, conversant  Eyes: sclera clear bilaterally, no discharge noted, eyelids normal in appearance  HENT: NCAT  Lungs: CTAB, normal respiratory effort and rate  CV: RRR, no MRGs  ABD: soft, non-tender, non-distended, normal bowel sounds  Skin: normal temperature, turgor, color, and texture  Psych: alert and oriented to person, place and situation, normal affect  Neuro: speech normal, moving all extremities, gait normal      Results for Keli Riddle (MRN [id number]):   Ref.  Range 1/13/2020 08:52   Bilirubin, total Latest Ref Range: 0.0 - 1.2 mg/dL 0.6   Bilirubin, direct Latest Ref Range: 0.00 - 0.40 mg/dL 0.24   Protein, total Latest Ref Range: 6.0 - 8.5 g/dL 7.8   Albumin Latest Ref Range: 3.5 - 5.5 g/dL 4.6   ALT (SGPT) Latest Ref Range: 0 - 44 IU/L 78 (H)   AST Latest Ref Range: 0 - 40 IU/L 123 (H)   Alk.  phosphatase Latest Ref Range: 39 - 117 IU/L 169 (H)       HEPATITIS PANEL, ACUTE (1/13/2020):  Component Value Flag Ref Range Units Status   Hepatitis A Ab, IgM Negative   Negative  Final   Hep B surface Ag screen Negative   Negative  Final   Hep B Core Ab, IgM Negative   Negative  Final   Hep C Virus Ab <0.1   0.0 - 0.9 s/co ratio Final       Assessment/Plan     HTN, well controlled:  -BP at goal of less than 140/90.   -Will continue current blood pressure medication regimen  -F/U in one month        Hepatitis:  -Suspect it is 2/2 DLOAN  -DDX includes adverse effect of statin  -Lab tests reviewed with patient  -Liver ultrasound and GGT ordered  -Handout given on DOLAN  -Follow-up in 1 month        Depresssion:  -Will continue current psychiatric medication  -Letter written today certifying that patient would benefit from acupuncture and/or massage therapy  -Follow-up in 1 month      Hyperlipidemia:  -Will continue current statin regimen  -Plan to discontinue statin if liver ultrasound is not consistent with DOLAN  -Follow-up in 1 month        Amador Tirado MD  1/20/2020, 12:52 PM

## 2020-01-20 NOTE — PATIENT INSTRUCTIONS
Nonalcoholic Steatohepatitis (DOLAN): Care Instructions Your Care Instructions Nonalcoholic steatohepatitis (DOLAN) is liver inflammation. It is caused by a buildup of fat in the liver. The fat buildup is not caused by drinking alcohol. Because of the inflammation, the liver does not work as well as it should. DOLAN is part of a group of liver diseases called nonalcoholic fatty liver disease. In these diseases, fat builds up in the liver and sometimes causes liver damage. This damage can get worse over time. Follow-up care is a key part of your treatment and safety. Be sure to make and go to all appointments, and call your doctor if you are having problems. It's also a good idea to know your test results and keep a list of the medicines you take. How can you care for yourself at home? · Stay at a healthy weight. Or if you need to, slowly get to a healthy weight. · Control your cholesterol. Talk to your doctor about ways to lower your cholesterol, if needed. You might try getting active, taking medicines, and making healthy changes to your diet. · Eat healthy foods. This includes fruits, vegetables, lean meats and dairy, and whole grains. · If you have diabetes, keep your blood sugar at your target level. · Get at least 30 minutes of exercise on most days of the week. Walking is a good choice. You also may want to do other activities, such as running, swimming, cycling, or playing tennis or team sports. · Limit alcohol, or do not drink. Alcohol can damage the liver and cause health problems. When should you call for help? Call 911 anytime you think you may need emergency care. For example, call if: 
  · You have trouble breathing.  
  · You vomit blood or what looks like coffee grounds.  
 Call your doctor now or seek immediate medical care if: 
  · You feel very sleepy or confused.  
  · You have new or worse belly pain.  
  · You have a fever.   · There is a new or increasing yellow tint to your skin or the whites of your eyes.  
  · You have any abnormal bleeding, such as: 
? Nosebleeds. ? Vaginal bleeding that is different (heavier, more frequent, at a different time of the month) than what you are used to. 
? Bloody or black stools, or rectal bleeding. ? Bloody or pink urine.  
 Watch closely for changes in your health, and be sure to contact your doctor if: 
  · Your belly is getting bigger.  
  · You are gaining weight.  
  · You have any problems. Where can you learn more? Go to http://yoko-filipe.info/. Enter B578 in the search box to learn more about \"Nonalcoholic Steatohepatitis (DOLAN): Care Instructions. \" Current as of: November 7, 2018 Content Version: 12.2 © 6844-9029 [employer]. Care instructions adapted under license by [employer] (which disclaims liability or warranty for this information). If you have questions about a medical condition or this instruction, always ask your healthcare professional. [address] any warranty or liability for your use of this information.

## 2020-01-20 NOTE — PROGRESS NOTES
1. Have you been to the ER, urgent care clinic since your last visit? Hospitalized since your last visit? No    2. Have you seen or consulted any other health care providers outside of the [address] [place] [address] since your last visit? Include any pap smears or colon screening.  No

## 2020-01-21 LAB — GGT SERPL-CCNC: 506 IU/L (ref 0–65)

## 2020-01-28 ENCOUNTER — HOSPITAL ENCOUNTER (OUTPATIENT)
Dept: ULTRASOUND IMAGING | Age: 50
Discharge: HOME | End: 2020-01-28
Attending: FAMILY MEDICINE
Payer: MEDICAID

## 2020-01-28 DIAGNOSIS — K75.9 HEPATITIS: ICD-10-CM

## 2020-01-28 PROCEDURE — 76705 ECHO EXAM OF ABDOMEN: CPT

## 2020-08-20 DIAGNOSIS — I10 ESSENTIAL HYPERTENSION WITH GOAL BLOOD PRESSURE LESS THAN 140/90: ICD-10-CM

## 2020-08-20 DIAGNOSIS — F32.A DEPRESSION, UNSPECIFIED DEPRESSION TYPE: ICD-10-CM

## 2020-08-25 RX ORDER — AMLODIPINE BESYLATE 5 MG/1
TABLET ORAL
Qty: 30 TAB | Refills: 1 | Status: SHIPPED | OUTPATIENT
Start: 2020-08-25 | End: 2020-11-04 | Stop reason: SDUPTHER

## 2020-08-25 RX ORDER — METOPROLOL TARTRATE 100 MG/1
TABLET ORAL
Qty: 60 TAB | Refills: 1 | Status: SHIPPED | OUTPATIENT
Start: 2020-08-25 | End: 2020-11-04 | Stop reason: SDUPTHER

## 2020-08-25 RX ORDER — LOSARTAN POTASSIUM AND HYDROCHLOROTHIAZIDE 12.5; 5 MG/1; MG/1
TABLET ORAL
Qty: 30 TAB | Refills: 1 | Status: SHIPPED | OUTPATIENT
Start: 2020-08-25 | End: 2020-11-04

## 2020-08-25 RX ORDER — TRAZODONE HYDROCHLORIDE 100 MG/1
TABLET ORAL
Qty: 30 TAB | Refills: 1 | Status: SHIPPED | OUTPATIENT
Start: 2020-08-25 | End: 2020-11-04 | Stop reason: SDUPTHER

## 2020-08-25 RX ORDER — BUPROPION HYDROCHLORIDE 300 MG/1
TABLET ORAL
Qty: 30 TAB | Refills: 1 | Status: SHIPPED | OUTPATIENT
Start: 2020-08-25 | End: 2020-11-04 | Stop reason: SDUPTHER

## 2020-11-25 ENCOUNTER — VIRTUAL VISIT (OUTPATIENT)
Dept: FAMILY MEDICINE CLINIC | Age: 50
End: 2020-11-25

## 2020-11-25 DIAGNOSIS — Z91.199 NO-SHOW FOR APPOINTMENT: Primary | ICD-10-CM

## 2020-11-25 RX ORDER — AMLODIPINE BESYLATE 5 MG/1
TABLET ORAL
Qty: 90 TAB | Refills: 1 | Status: CANCELLED | OUTPATIENT
Start: 2020-11-25

## 2020-11-25 RX ORDER — LOSARTAN POTASSIUM AND HYDROCHLOROTHIAZIDE 12.5; 5 MG/1; MG/1
TABLET ORAL
Qty: 90 TAB | Refills: 1 | Status: CANCELLED | OUTPATIENT
Start: 2020-11-25

## 2020-11-25 RX ORDER — BUPROPION HYDROCHLORIDE 300 MG/1
TABLET ORAL
Qty: 90 TAB | Refills: 1 | Status: CANCELLED | OUTPATIENT
Start: 2020-11-25

## 2020-11-25 RX ORDER — METOPROLOL TARTRATE 100 MG/1
TABLET ORAL
Qty: 180 TAB | Refills: 1 | Status: CANCELLED | OUTPATIENT
Start: 2020-11-25

## 2020-11-25 RX ORDER — TRAZODONE HYDROCHLORIDE 100 MG/1
TABLET ORAL
Qty: 90 TAB | Refills: 1 | Status: CANCELLED | OUTPATIENT
Start: 2020-11-25

## 2020-11-25 RX ORDER — DULOXETIN HYDROCHLORIDE 60 MG/1
CAPSULE, DELAYED RELEASE ORAL
Qty: 90 CAP | Refills: 1 | Status: CANCELLED | OUTPATIENT
Start: 2020-11-25

## 2020-11-25 RX ORDER — PRAVASTATIN SODIUM 40 MG/1
40 TABLET ORAL
Qty: 90 TAB | Refills: 1 | Status: CANCELLED | OUTPATIENT
Start: 2020-11-25

## 2020-11-25 NOTE — PROGRESS NOTES
1. Have you been to the ER, urgent care clinic since your last visit? Hospitalized since your last visit? No    2. Have you seen or consulted any other health care providers outside of the [address] [place] [address] since your last visit? Include any pap smears or colon screening.  No

## 2020-11-27 DIAGNOSIS — I10 ESSENTIAL HYPERTENSION WITH GOAL BLOOD PRESSURE LESS THAN 140/90: ICD-10-CM

## 2020-11-28 DIAGNOSIS — I10 ESSENTIAL HYPERTENSION WITH GOAL BLOOD PRESSURE LESS THAN 140/90: ICD-10-CM

## 2020-11-29 RX ORDER — AMLODIPINE BESYLATE 5 MG/1
TABLET ORAL
Qty: 30 TAB | Refills: 0 | Status: SHIPPED | OUTPATIENT
Start: 2020-11-29 | End: 2020-12-17 | Stop reason: SDUPTHER

## 2020-11-29 RX ORDER — METOPROLOL TARTRATE 100 MG/1
TABLET ORAL
Qty: 60 TAB | Refills: 0 | Status: SHIPPED | OUTPATIENT
Start: 2020-11-29 | End: 2020-12-17 | Stop reason: SDUPTHER

## 2020-11-29 RX ORDER — LOSARTAN POTASSIUM AND HYDROCHLOROTHIAZIDE 12.5; 5 MG/1; MG/1
TABLET ORAL
Qty: 30 TAB | Refills: 0 | Status: SHIPPED | OUTPATIENT
Start: 2020-11-29 | End: 2020-12-17 | Stop reason: SDUPTHER

## 2020-12-02 DIAGNOSIS — F32.A DEPRESSION, UNSPECIFIED DEPRESSION TYPE: ICD-10-CM

## 2020-12-04 RX ORDER — DULOXETIN HYDROCHLORIDE 60 MG/1
CAPSULE, DELAYED RELEASE ORAL
Qty: 30 CAP | Refills: 1 | Status: SHIPPED | OUTPATIENT
Start: 2020-12-04 | End: 2020-12-17 | Stop reason: SDUPTHER

## 2020-12-04 RX ORDER — BUPROPION HYDROCHLORIDE 300 MG/1
TABLET ORAL
Qty: 30 TAB | Refills: 1 | Status: SHIPPED | OUTPATIENT
Start: 2020-12-04 | End: 2020-12-17 | Stop reason: SDUPTHER

## 2020-12-04 RX ORDER — TRAZODONE HYDROCHLORIDE 100 MG/1
TABLET ORAL
Qty: 30 TAB | Refills: 1 | Status: SHIPPED | OUTPATIENT
Start: 2020-12-04 | End: 2020-12-17 | Stop reason: SDUPTHER

## 2020-12-17 ENCOUNTER — VIRTUAL VISIT (OUTPATIENT)
Dept: FAMILY MEDICINE CLINIC | Age: 50
End: 2020-12-17
Payer: MEDICAID

## 2020-12-17 DIAGNOSIS — F32.A DEPRESSION, UNSPECIFIED DEPRESSION TYPE: ICD-10-CM

## 2020-12-17 DIAGNOSIS — E78.5 HYPERLIPIDEMIA, UNSPECIFIED HYPERLIPIDEMIA TYPE: ICD-10-CM

## 2020-12-17 DIAGNOSIS — I10 ESSENTIAL HYPERTENSION WITH GOAL BLOOD PRESSURE LESS THAN 140/90: ICD-10-CM

## 2020-12-17 DIAGNOSIS — K75.81 NASH (NONALCOHOLIC STEATOHEPATITIS): Primary | ICD-10-CM

## 2020-12-17 PROCEDURE — 99214 OFFICE O/P EST MOD 30 MIN: CPT | Performed by: FAMILY MEDICINE

## 2020-12-17 RX ORDER — DULOXETIN HYDROCHLORIDE 60 MG/1
CAPSULE, DELAYED RELEASE ORAL
Qty: 30 CAP | Refills: 0 | Status: SHIPPED | OUTPATIENT
Start: 2020-12-17 | End: 2021-02-04 | Stop reason: SDUPTHER

## 2020-12-17 RX ORDER — METOPROLOL TARTRATE 100 MG/1
TABLET ORAL
Qty: 60 TAB | Refills: 0 | Status: SHIPPED | OUTPATIENT
Start: 2020-12-17 | End: 2020-12-27

## 2020-12-17 RX ORDER — AMLODIPINE BESYLATE 5 MG/1
TABLET ORAL
Qty: 30 TAB | Refills: 0 | Status: SHIPPED | OUTPATIENT
Start: 2020-12-17 | End: 2020-12-27

## 2020-12-17 RX ORDER — TRAZODONE HYDROCHLORIDE 100 MG/1
TABLET ORAL
Qty: 30 TAB | Refills: 0 | Status: SHIPPED | OUTPATIENT
Start: 2020-12-17 | End: 2021-02-04 | Stop reason: SDUPTHER

## 2020-12-17 RX ORDER — PRAVASTATIN SODIUM 40 MG/1
40 TABLET ORAL
Qty: 30 TAB | Refills: 0 | Status: SHIPPED | OUTPATIENT
Start: 2020-12-17 | End: 2021-02-04 | Stop reason: SDUPTHER

## 2020-12-17 RX ORDER — BUPROPION HYDROCHLORIDE 300 MG/1
TABLET ORAL
Qty: 30 TAB | Refills: 0 | Status: SHIPPED | OUTPATIENT
Start: 2020-12-17 | End: 2021-02-04 | Stop reason: SDUPTHER

## 2020-12-17 RX ORDER — LOSARTAN POTASSIUM AND HYDROCHLOROTHIAZIDE 12.5; 5 MG/1; MG/1
TABLET ORAL
Qty: 30 TAB | Refills: 0 | Status: SHIPPED | OUTPATIENT
Start: 2020-12-17 | End: 2021-02-04 | Stop reason: SDUPTHER

## 2020-12-17 NOTE — PROGRESS NOTES
1 st attempt-  No answer. Left message to return call for check in prior to virtual appointment. Left my desk extension for patient to return call too. 1. Have you been to the ER, urgent care clinic since your last visit? Hospitalized since your last visit? No    2. Have you seen or consulted any other health care providers outside of the [address] [place] [address] since your last visit? Include any pap smears or colon screening.  No

## 2020-12-17 NOTE — PROGRESS NOTES
[facility]    CC: F/U for Chronic Diseases Management    HPI:     Andrew Cardenas, who was evaluated through a synchronous (real-time) audio-video encounter, and/or his healthcare decision maker, is aware that it is a billable service, with coverage as determined by his insurance carrier. He provided verbal consent to proceed: Yes, and patient identification was verified. It was conducted pursuant to the emergency declaration under the [address] [place] [address], [address] [place] [address] and the [employer] and [employer] Act. A caregiver was present when appropriate. Ability to conduct physical exam was limited. I was in the office. The patient was at home.       HTN:   -Has not followed up as recommended due to COVID-19 pandemic  -Taking blood pressure medications prescribed  -Denies any side effects or issues with blood pressure medication  -Does not check blood pressure at home  -Not following any regular exercise regimen  -Diet unchanged since last visit        Hepatitis:  -Has not followed up as recommended due to COVID-19 pandemic  -Got requested imaging and lab work  -Diet unchanged since last visit  -Denies any associated symptoms         Depresssion:  -Taking psychiatric medication as prescribed  -Denies any side effects or issues with psychiatric medication  -Reports that regimen helps control his mood        HLD:  -Has not followed up as recommended due to COVID-19 pandemic  -Taking statin as prescribed  -Denies any known side effects or issues with the medication regimen  -Not following any regular exercise regimen  -Diet unchanged since last visit      ROS: Positive items marked in RED  CON: fever, chills  Cardiovascular: palpitations, CP  Resp: SOB, cough  GI: nausea, vomiting, diarrhea  : dysuria, hematuria      Past Medical History:   Diagnosis Date    Depression     Hypercholesterolemia     Hypertension     Obesity, Class III, BMI 40-49.9 (morbid obesity) ([facility] [address] 3/14/2014       Past Surgical History:   Procedure Laterality Date    HX COLONOSCOPY Bilateral 2018    Dr. Irizarry        Family History   Problem Relation Age of Onset    Hypertension Mother     Arthritis-osteo Mother     Cancer Father         pancreatic    Heart Disease Father 61        CABG   [facility] Arthritis-rheumatoid Father     Hypertension Sister     Hypertension Brother     Heart Disease Brother 48        CABG       Social History     Tobacco Use    Smoking status: Never Smoker    Smokeless tobacco: Never Used   Substance Use Topics    Alcohol use:  Yes     Alcohol/week: 16.7 standard drinks     Types: 20 drink(s) per week     Comment: 4-5 days a week    Drug use: No       Allergies   Allergen Reactions    Lisinopril Cough         Current Outpatient Medications:     buPROPion XL (WELLBUTRIN XL) 300 mg XL tablet, TAKE 1 TABLET BY MOUTH EVERY DAY IN THE MORNING, Disp: 30 Tab, Rfl: 1    traZODone (DESYREL) 100 mg tablet, TAKE 1 TABLET BY MOUTH EVERY DAY EVERY NIGHT, Disp: 30 Tab, Rfl: 1    DULoxetine (CYMBALTA) 60 mg capsule, TAKE 1 CAPSULE BY MOUTH EVERY DAY, Disp: 30 Cap, Rfl: 1    metoprolol tartrate (LOPRESSOR) 100 mg IR tablet, TAKE 1 TABLET BY MOUTH TWICE A DAY, Disp: 60 Tab, Rfl: 0    amLODIPine (NORVASC) 5 mg tablet, TAKE 1 TABLET BY MOUTH EVERY DAY, Disp: 30 Tab, Rfl: 0    losartan-hydroCHLOROthiazide (HYZAAR) 50-12.5 mg per tablet, TAKE 1 TABLET BY MOUTH EVERY DAY, Disp: 30 Tab, Rfl: 0    pravastatin (PRAVACHOL) 40 mg tablet, Take 1 Tab by mouth nightly., Disp: 30 Tab, Rfl: 0    Physical Exam:      General: obese habitus, NAD, conversant  Eyes: sclera clear bilaterally, no discharge noted, eyelids normal in appearance, EOMI  HENT: NCAT  Neck: no masses visualized, trachea appears to be midline  Lungs: normal respiratory effort and rate, No visualized signs of difficulty breathing or respiratory distress  MS: normal AROM of neck noted  Skin: no significant exanthematous lesions or discoloration noted on neck/facial skin    Psych: alert and oriented to person, place and situation, normal affect  Neuro: speech normal, moving all upper extremities, no gaze palsy, no facial asymmetry (Cranial nerve 7 motor function) (limited exam due to video visit)      [facility] [address] (1/28/2020): IMPRESSION:  Hepatic steatosis. No suspicious liver mass. Results for Gabby Serrato (MRN [id number]):   Ref. Range 1/20/2020 13:41   GGT Latest Ref Range: 0 - 65 IU/L 506 (H)       Assessment/Plan     DOLAN:  -Likely diagnosois  -Counseled on ultrasound results  -Encouraged to work on recommend lifestyle changes  -CMP ordered  -Follow-up in 1 month to establish care with new PCP      Hyperlipidemia:  -Current status unknown  -Will continue current statin regimen  -CMP and fasting lipid panel ordered  -Follow-up in 1 month to establish care with new PCP      HTN, likely well controlled:  -Ability to assess issue limited by nature of visit type, but BP was at goal of less than 140/90 at prior visit on current medication regimen  -Will continue current blood pressure medication regimen  -CBC, CMP, and fasting lipid panel ordered  -Follow-up in one month to establish care with new PCP        Depression, well controlled:  -Will continue current psychiatric medication regimen  -Follow-up in 1 month to establish care with new PCP        Margo Alonso is a 44 y.o. male being evaluated by a Virtual Visit (video visit) encounter to address concerns as mentioned above. A caregiver was present when appropriate. Due to this being a TeleHealth encounter (During [place] [address] public health emergency), evaluation of the following organ systems was limited: Vitals/Constitutional/EENT/Resp/CV/GI//MS/Neuro/Skin/Heme-Lymph-Imm.   Pursuant to the emergency declaration under the [facility], [address] [place] [address] waiver authority and the [employer] and [employer], this Virtual Visit was conducted with patient's (and/or legal guardian's) consent, to reduce the risk of exposure to COVID-19 and provide necessary medical care. Services were provided through a video synchronous discussion virtually to substitute for in-person encounter. --Aleah Gonzalez MD on 12/17/2020 at 11:16 AM    An electronic signature was used to authenticate this note.

## 2020-12-26 DIAGNOSIS — I10 ESSENTIAL HYPERTENSION WITH GOAL BLOOD PRESSURE LESS THAN 140/90: ICD-10-CM

## 2020-12-27 RX ORDER — METOPROLOL TARTRATE 100 MG/1
TABLET ORAL
Qty: 60 TAB | Refills: 0 | Status: SHIPPED | OUTPATIENT
Start: 2020-12-27 | End: 2021-02-04 | Stop reason: SDUPTHER

## 2020-12-27 RX ORDER — AMLODIPINE BESYLATE 5 MG/1
TABLET ORAL
Qty: 30 TAB | Refills: 0 | Status: SHIPPED | OUTPATIENT
Start: 2020-12-27 | End: 2021-02-04 | Stop reason: SDUPTHER

## 2020-12-28 ENCOUNTER — APPOINTMENT (OUTPATIENT)
Dept: FAMILY MEDICINE CLINIC | Age: 50
End: 2020-12-28

## 2020-12-28 DIAGNOSIS — E78.5 HYPERLIPIDEMIA, UNSPECIFIED HYPERLIPIDEMIA TYPE: ICD-10-CM

## 2020-12-28 DIAGNOSIS — I10 ESSENTIAL HYPERTENSION WITH GOAL BLOOD PRESSURE LESS THAN 140/90: ICD-10-CM

## 2020-12-29 LAB
ALBUMIN SERPL-MCNC: 4.4 G/DL (ref 3.8–4.9)
ALBUMIN/GLOB SERPL: 1.5 {RATIO} (ref 1.2–2.2)
ALP SERPL-CCNC: 140 IU/L (ref 39–117)
ALT SERPL-CCNC: 98 IU/L (ref 0–44)
AST SERPL-CCNC: 110 IU/L (ref 0–40)
BILIRUB SERPL-MCNC: 0.5 MG/DL (ref 0–1.2)
BUN SERPL-MCNC: 12 MG/DL (ref 6–24)
BUN/CREAT SERPL: 14 (ref 9–20)
CALCIUM SERPL-MCNC: 10.1 MG/DL (ref 8.7–10.2)
CHLORIDE SERPL-SCNC: 99 MMOL/L (ref 96–106)
CHOLEST SERPL-MCNC: 216 MG/DL (ref 100–199)
CO2 SERPL-SCNC: 25 MMOL/L (ref 20–29)
CREAT SERPL-MCNC: 0.83 MG/DL (ref 0.76–1.27)
ERYTHROCYTE [DISTWIDTH] IN BLOOD BY AUTOMATED COUNT: 12.8 % (ref 11.6–15.4)
GLOBULIN SER CALC-MCNC: 2.9 G/DL (ref 1.5–4.5)
GLUCOSE SERPL-MCNC: 147 MG/DL (ref 65–99)
HCT VFR BLD AUTO: 40.9 % (ref 37.5–51)
HDLC SERPL-MCNC: 61 MG/DL
HGB BLD-MCNC: 13.6 G/DL (ref 13–17.7)
INTERPRETATION, 910389: NORMAL
LDLC SERPL CALC-MCNC: 135 MG/DL (ref 0–99)
MCH RBC QN AUTO: 32.8 PG (ref 26.6–33)
MCHC RBC AUTO-ENTMCNC: 33.3 G/DL (ref 31.5–35.7)
MCV RBC AUTO: 99 FL (ref 79–97)
PLATELET # BLD AUTO: 236 X10E3/UL (ref 150–450)
POTASSIUM SERPL-SCNC: 4.9 MMOL/L (ref 3.5–5.2)
PROT SERPL-MCNC: 7.3 G/DL (ref 6–8.5)
RBC # BLD AUTO: 4.15 X10E6/UL (ref 4.14–5.8)
SODIUM SERPL-SCNC: 141 MMOL/L (ref 134–144)
TRIGL SERPL-MCNC: 113 MG/DL (ref 0–149)
VLDLC SERPL CALC-MCNC: 20 MG/DL (ref 5–40)
WBC # BLD AUTO: 9.5 X10E3/UL (ref 3.4–10.8)

## 2021-01-04 PROBLEM — K75.81 NASH (NONALCOHOLIC STEATOHEPATITIS): Status: ACTIVE | Noted: 2021-01-04

## 2021-01-06 ENCOUNTER — E-VISIT (OUTPATIENT)
Dept: FAMILY MEDICINE CLINIC | Age: 51
End: 2021-01-06

## 2021-02-04 ENCOUNTER — VIRTUAL VISIT (OUTPATIENT)
Dept: FAMILY MEDICINE CLINIC | Age: 51
End: 2021-02-04
Payer: MEDICAID

## 2021-02-04 DIAGNOSIS — D49.9 PRECANCEROUS LESION: ICD-10-CM

## 2021-02-04 DIAGNOSIS — I10 ESSENTIAL HYPERTENSION WITH GOAL BLOOD PRESSURE LESS THAN 140/90: Primary | ICD-10-CM

## 2021-02-04 DIAGNOSIS — K75.81 NASH (NONALCOHOLIC STEATOHEPATITIS): ICD-10-CM

## 2021-02-04 DIAGNOSIS — E78.5 HYPERLIPIDEMIA, UNSPECIFIED HYPERLIPIDEMIA TYPE: ICD-10-CM

## 2021-02-04 DIAGNOSIS — F32.A DEPRESSION, UNSPECIFIED DEPRESSION TYPE: ICD-10-CM

## 2021-02-04 PROBLEM — F10.11 HISTORY OF ALCOHOL ABUSE: Status: ACTIVE | Noted: 2021-02-04

## 2021-02-04 PROBLEM — R73.03 PREDIABETES: Status: ACTIVE | Noted: 2021-02-04

## 2021-02-04 PROCEDURE — 99214 OFFICE O/P EST MOD 30 MIN: CPT | Performed by: NURSE PRACTITIONER

## 2021-02-04 RX ORDER — BUPROPION HYDROCHLORIDE 300 MG/1
TABLET ORAL
Qty: 90 TAB | Refills: 0 | Status: SHIPPED | OUTPATIENT
Start: 2021-02-04 | End: 2021-04-26

## 2021-02-04 RX ORDER — DULOXETIN HYDROCHLORIDE 60 MG/1
CAPSULE, DELAYED RELEASE ORAL
Qty: 90 CAP | Refills: 0 | Status: SHIPPED | OUTPATIENT
Start: 2021-02-04 | End: 2021-04-26

## 2021-02-04 RX ORDER — AMLODIPINE BESYLATE 5 MG/1
5 TABLET ORAL DAILY
Qty: 90 TAB | Refills: 0 | Status: SHIPPED | OUTPATIENT
Start: 2021-02-04 | End: 2021-04-27

## 2021-02-04 RX ORDER — METOPROLOL TARTRATE 100 MG/1
100 TABLET ORAL 2 TIMES DAILY
Qty: 180 TAB | Refills: 0 | Status: SHIPPED | OUTPATIENT
Start: 2021-02-04 | End: 2021-04-27

## 2021-02-04 RX ORDER — LOSARTAN POTASSIUM AND HYDROCHLOROTHIAZIDE 12.5; 5 MG/1; MG/1
TABLET ORAL
Qty: 90 TAB | Refills: 0 | Status: SHIPPED | OUTPATIENT
Start: 2021-02-04 | End: 2021-04-26

## 2021-02-04 RX ORDER — PRAVASTATIN SODIUM 40 MG/1
40 TABLET ORAL
Qty: 90 TAB | Refills: 0 | Status: SHIPPED | OUTPATIENT
Start: 2021-02-04 | End: 2021-04-26

## 2021-02-04 RX ORDER — TRAZODONE HYDROCHLORIDE 100 MG/1
TABLET ORAL
Qty: 90 TAB | Refills: 0 | Status: SHIPPED | OUTPATIENT
Start: 2021-02-04 | End: 2021-04-26

## 2021-02-04 NOTE — PROGRESS NOTES
Chief Complaint   Patient presents with   [facility] Establish Care    Medication Refill       1. Have you been to the ER, urgent care clinic since your last visit? Hospitalized since your last visit? No    2. Have you seen or consulted any other health care providers outside of the [address] [place] [address] since your last visit? Include any pap smears or colon screening. No- Does not see psych       Chief Complaint   Patient presents with   [facility] Establish Care     [email or web address]    Medication Refill       3 most recent PHQ Screens 2/4/2021   PHQ Not Done -   Little interest or pleasure in doing things Nearly every day   Feeling down, depressed, irritable, or hopeless Nearly every day   Total Score PHQ 2 6   Trouble falling or staying asleep, or sleeping too much -   Feeling tired or having little energy -   Poor appetite, weight loss, or overeating -   Feeling bad about yourself - or that you are a failure or have let yourself or your family down -   Trouble concentrating on things such as school, work, reading, or watching TV -   Moving or speaking so slowly that other people could have noticed; or the opposite being so fidgety that others notice -   Thoughts of being better off dead, or hurting yourself in some way -   PHQ 9 Score -   How difficult have these problems made it for you to do your work, take care of your home and get along with others -     Fall Risk Assessment, last 12 mths 2/4/2021   Able to walk? Yes   Fall in past 12 months? 0   Do you feel unsteady? 0   Are you worried about falling 0               Learning Assessment 3/13/2019   PRIMARY LEARNER Patient   HIGHEST LEVEL OF EDUCATION - PRIMARY LEARNER  4 YEARS OF COLLEGE   BARRIERS PRIMARY LEARNER NONE   CO-LEARNER CAREGIVER No   PRIMARY LANGUAGE ENGLISH   LEARNER PREFERENCE PRIMARY DEMONSTRATION   ANSWERED BY self   RELATIONSHIP SELF     There were no vitals taken for this visit.

## 2021-02-04 NOTE — PROGRESS NOTES
[address] [place] [address]               [phone]      Cherise Andrew is a 44 y.o. male who was seen by synchronous (real-time) audio-video technology on 2/4/2021. Consent: Cherise Andrew, who was seen by synchronous (real-time) audio-video technology, and/or his healthcare decision maker, is aware that this patient-initiated, Telehealth encounter on 2/4/2021 is a billable service, with coverage as determined by his insurance carrier. He is aware that he may receive a bill and has provided verbal consent to proceed: Yes. Assessment & Plan:   Diagnoses and all orders for this visit:    1. Essential hypertension with goal blood pressure less than 140/90  -     metoprolol tartrate (LOPRESSOR) 100 mg IR tablet; Take 1 Tab by mouth two (2) times a day. -     losartan-hydroCHLOROthiazide (HYZAAR) 50-12.5 mg per tablet; TAKE 1 TABLET BY MOUTH EVERY DAY  -     amLODIPine (NORVASC) 5 mg tablet; Take 1 Tab by mouth daily. Endorses medication compliance  Home blood pressure checks are less than 130/80, continue same medication regimen  Refills provided  Labs with next visit    2. Hyperlipidemia, unspecified hyperlipidemia type  -     pravastatin (PRAVACHOL) 40 mg tablet; Take 1 Tab by mouth nightly. Endorses medication compliance  Denies signs and symptoms of myalgia  Refill provided  Labs with next visit    3. Depression, unspecified depression type  -     traZODone (DESYREL) 100 mg tablet; TAKE 1 TABLET BY MOUTH EVERY DAY EVERY NIGHT  -     DULoxetine (CYMBALTA) 60 mg capsule; TAKE 1 CAPSULE BY MOUTH EVERY DAY  -     buPROPion XL (WELLBUTRIN XL) 300 mg XL tablet; TAKE 1 TABLET BY MOUTH EVERY DAY IN THE MORNING  Endorses medication compliance  States he always feels depressed however the symptoms are tolerable on his current medication regimen, will continue the same  Refills provided    4.  Precancerous lesion  In January 2018 he had precancerous polyps removed during a colonoscopy  Has repeat colonoscopy scheduled for 2/25/2021    5. DOLAN (nonalcoholic steatohepatitis)  He has made recommended lifestyle changes and endorses a weight loss of 13 pounds  Endorses alcohol use however states he is a social drinker, 1-2 drinks a week  Labs with next visit    Follow-up and Dispositions    · Return in about 3 months (around 5/4/2021) for HLD, HTN, depression, 15 min, VV.             712  Subjective:   Moises Hargrove is a 44 y.o. male who was seen for   [address] ([email or web address]) and Medication Refill    DOLAN/hepatitis  Was recommend lifestyle changes with last visit  Has lost weight: current weight 237, end of December was 250      Hypertension:   Patient reports taking medications as instructed. yes   Medication side effects noted. no  Headache upon wakening. no   Home BP monitoring in range of 123/84 p. 67.    Do you experience chest pain/pressure or SOB with exertion? no  Maintain a low salt diet? yes  Key CAD CHF Meds             pravastatin (PRAVACHOL) 40 mg tablet (Taking) Take 1 Tab by mouth nightly. metoprolol tartrate (LOPRESSOR) 100 mg IR tablet (Taking) Take 1 Tab by mouth two (2) times a day. losartan-hydroCHLOROthiazide (HYZAAR) 50-12.5 mg per tablet (Taking) TAKE 1 TABLET BY MOUTH EVERY DAY    amLODIPine (NORVASC) 5 mg tablet (Taking) Take 1 Tab by mouth daily. HLD:  Has been compliant with meds  all of the time  Compliant with low-fat diet. most of the time    Denies myalgias or other side effects.  yes  Cholesterol, total   Date Value Ref Range Status   12/28/2020 216 (H) 100 - 199 mg/dL Final     Triglyceride   Date Value Ref Range Status   12/28/2020 113 0 - 149 mg/dL Final     HDL Cholesterol   Date Value Ref Range Status   12/28/2020 61 >39 mg/dL Final     LDL, calculated   Date Value Ref Range Status   12/28/2020 135 (H) 0 - 99 mg/dL Final   ]  Key Antihyperlipidemia Meds             pravastatin (PRAVACHOL) 40 mg tablet (Taking) Take 1 Tab by mouth nightly. Depression  States he has had it for many years  Just deals with it  Trazodone for sleep  cymbalta and wellbutrin for depression  Feels like the medications are helping some  Has been to psychiatry /therapy several times, last was 7-8 years ago  He never had any benefit from the visits    Prior to Admission medications    Medication Sig Start Date End Date Taking? Authorizing Provider   traZODone (DESYREL) 100 mg tablet TAKE 1 TABLET BY MOUTH EVERY DAY EVERY NIGHT 2/4/21  Yes Deidra Mojica NP   pravastatin (PRAVACHOL) 40 mg tablet Take 1 Tab by mouth nightly. 2/4/21  Yes Deidra Mojica NP   metoprolol tartrate (LOPRESSOR) 100 mg IR tablet Take 1 Tab by mouth two (2) times a day. 2/4/21  Yes Deidra Mojica NP   losartan-hydroCHLOROthiazide (HYZAAR) 50-12.5 mg per tablet TAKE 1 TABLET BY MOUTH EVERY DAY 2/4/21  Yes Deidra Mojica NP   DULoxetine (CYMBALTA) 60 mg capsule TAKE 1 CAPSULE BY MOUTH EVERY DAY 2/4/21  Yes Deidra Mojica NP   buPROPion XL (WELLBUTRIN XL) 300 mg XL tablet TAKE 1 TABLET BY MOUTH EVERY DAY IN THE MORNING 2/4/21  Yes Deidra Mojica NP   amLODIPine (NORVASC) 5 mg tablet Take 1 Tab by mouth daily.  2/4/21  Yes Deidra Mojica NP     Allergies   Allergen Reactions    Lisinopril Cough       Patient Active Problem List   Diagnosis Code    Essential hypertension with goal blood pressure less than 140/90 I10    Hyperlipidemia E78.5    Dysthymia F34.1    Precancerous lesion D49.9    Severe obesity ([address] E66.01    DOLAN (nonalcoholic steatohepatitis) K75.81    Prediabetes R73.03    History of alcohol abuse F10.11     Past Surgical History:   Procedure Laterality Date    HX COLONOSCOPY Bilateral 2018    Dr. Salvador Gipson      Family History   Problem Relation Age of Onset    Hypertension Mother     Arthritis-osteo Mother     Cancer Father         pancreatic    Heart Disease Father 61        CABG    Arthritis-rheumatoid Father     Hypertension Sister     Hypertension Brother     Heart Disease Brother 48        CABG     Social History     Tobacco Use    Smoking status: Never Smoker    Smokeless tobacco: Never Used   Substance Use Topics    Alcohol use: Not Currently     Alcohol/week: 16.7 standard drinks     Types: 20 Standard drinks or equivalent per week     Comment: 4-5 days a week       ROS  As stated in HPI, otherwise all others negative. Objective: There were no vitals taken for this visit. General: alert, cooperative, no distress   Mental  status: normal mood, behavior, speech, dress, motor activity, and thought processes, able to follow commands   HENT: NCAT   Neck: no visualized mass   Resp: no respiratory distress   Neuro: no gross deficits   Skin: no discoloration or lesions of concern on visible areas   Psychiatric: normal affect, consistent with stated mood, no evidence of hallucinations     Additional exam findings: We discussed the expected course, resolution and complications of the diagnosis(es) in detail. Medication risks, benefits, costs, interactions, and alternatives were discussed as indicated. I advised him to contact the office if his condition worsens, changes or fails to improve as anticipated. He expressed understanding with the diagnosis(es) and plan. Maria Esther Lara is a 44 y.o. male who was evaluated by a video visit encounter for concerns as above. Patient identification was verified prior to start of the visit. A caregiver was present when appropriate. Due to this being a TeleHealth encounter (During [place]-21 public health emergency), evaluation of the following organ systems was limited: Vitals/Constitutional/EENT/Resp/CV/GI//MS/Neuro/Skin/Heme-Lymph-Imm.   Pursuant to the emergency declaration under the [facility], 1135 waiver authority and the [employer] Act, this Virtual  Visit was conducted, with patient's (and/or legal guardian's) consent, to reduce the patient's risk of exposure to COVID-19 and provide necessary medical care. Services were provided through a video synchronous discussion virtually to substitute for in-person clinic visit. Patient and provider were located at their individual homes. An After Visit Summary was printed and given to the patient. All diagnosis have been discussed with the patient and all of the patient's questions have been answered. Follow-up and Dispositions    · Return in about 3 months (around 5/4/2021) for HLD, HTN, depression, 15 min, VV. [place] [id number] [address]   Torres Hernandez

## 2021-04-23 DIAGNOSIS — E78.5 HYPERLIPIDEMIA, UNSPECIFIED HYPERLIPIDEMIA TYPE: ICD-10-CM

## 2021-04-23 DIAGNOSIS — I10 ESSENTIAL HYPERTENSION WITH GOAL BLOOD PRESSURE LESS THAN 140/90: ICD-10-CM

## 2021-04-23 DIAGNOSIS — F32.A DEPRESSION, UNSPECIFIED DEPRESSION TYPE: ICD-10-CM

## 2021-04-26 RX ORDER — LOSARTAN POTASSIUM AND HYDROCHLOROTHIAZIDE 12.5; 5 MG/1; MG/1
TABLET ORAL
Qty: 90 TAB | Refills: 0 | Status: SHIPPED | OUTPATIENT
Start: 2021-04-26 | End: 2021-07-09

## 2021-04-26 RX ORDER — DULOXETIN HYDROCHLORIDE 60 MG/1
CAPSULE, DELAYED RELEASE ORAL
Qty: 90 CAP | Refills: 0 | Status: SHIPPED | OUTPATIENT
Start: 2021-04-26 | End: 2021-07-09

## 2021-04-26 RX ORDER — PRAVASTATIN SODIUM 40 MG/1
TABLET ORAL
Qty: 90 TAB | Refills: 0 | Status: SHIPPED | OUTPATIENT
Start: 2021-04-26 | End: 2021-07-09

## 2021-04-26 RX ORDER — BUPROPION HYDROCHLORIDE 300 MG/1
TABLET ORAL
Qty: 90 TAB | Refills: 0 | Status: SHIPPED | OUTPATIENT
Start: 2021-04-26 | End: 2021-07-09

## 2021-04-26 RX ORDER — TRAZODONE HYDROCHLORIDE 100 MG/1
TABLET ORAL
Qty: 90 TAB | Refills: 0 | Status: SHIPPED | OUTPATIENT
Start: 2021-04-26 | End: 2021-07-09

## 2021-04-27 DIAGNOSIS — I10 ESSENTIAL HYPERTENSION WITH GOAL BLOOD PRESSURE LESS THAN 140/90: ICD-10-CM

## 2021-04-27 RX ORDER — METOPROLOL TARTRATE 100 MG/1
TABLET ORAL
Qty: 180 TAB | Refills: 0 | Status: SHIPPED | OUTPATIENT
Start: 2021-04-27 | End: 2021-07-09

## 2021-04-27 RX ORDER — AMLODIPINE BESYLATE 5 MG/1
TABLET ORAL
Qty: 90 TAB | Refills: 0 | Status: SHIPPED | OUTPATIENT
Start: 2021-04-27 | End: 2021-07-09

## 2021-05-06 ENCOUNTER — VIRTUAL VISIT (OUTPATIENT)
Dept: FAMILY MEDICINE CLINIC | Age: 51
End: 2021-05-06
Payer: MEDICAID

## 2021-05-06 DIAGNOSIS — R73.03 PREDIABETES: ICD-10-CM

## 2021-05-06 DIAGNOSIS — F34.1 DYSTHYMIA: ICD-10-CM

## 2021-05-06 DIAGNOSIS — I10 ESSENTIAL HYPERTENSION WITH GOAL BLOOD PRESSURE LESS THAN 140/90: Primary | ICD-10-CM

## 2021-05-06 DIAGNOSIS — E78.2 MIXED HYPERLIPIDEMIA: ICD-10-CM

## 2021-05-06 PROCEDURE — 99214 OFFICE O/P EST MOD 30 MIN: CPT | Performed by: NURSE PRACTITIONER

## 2021-05-06 RX ORDER — ASPIRIN 81 MG/1
81 TABLET ORAL DAILY
COMMUNITY
Start: 2021-04-01

## 2021-05-06 NOTE — PROGRESS NOTES
For virtual visit patient would like to receive link via EMAIL: [email or web address]    Shruti Bonilla is a 44 y.o. male (: 1970) evaluated via telephone on 2021 to address:    Chief Complaint   Patient presents with    Hypertension    Cholesterol Problem    Depression       Patient-Reported Vitals 2021   Patient-Reported Weight 218   Patient-Reported Height 5'10\"   Patient-Reported Pulse 78   Patient-Reported Temperature -   Patient-Reported Systolic  985   Patient-Reported Diastolic 76        Allergies Reviewed.     Learning Assessment:     Learning Assessment 3/13/2019   PRIMARY LEARNER Patient   HIGHEST LEVEL OF EDUCATION - PRIMARY LEARNER  4 YEARS OF COLLEGE   BARRIERS PRIMARY LEARNER NONE   CO-LEARNER CAREGIVER No   PRIMARY LANGUAGE ENGLISH   LEARNER PREFERENCE PRIMARY DEMONSTRATION   ANSWERED BY self   RELATIONSHIP SELF     Depression Screening:     3 most recent PHQ Screens 2021   PHQ Not Done -   Little interest or pleasure in doing things More than half the days   Feeling down, depressed, irritable, or hopeless Nearly every day   Total Score PHQ 2 5   Trouble falling or staying asleep, or sleeping too much More than half the days   Feeling tired or having little energy Nearly every day   Poor appetite, weight loss, or overeating Not at all   Feeling bad about yourself - or that you are a failure or have let yourself or your family down More than half the days   Trouble concentrating on things such as school, work, reading, or watching TV Nearly every day   Moving or speaking so slowly that other people could have noticed; or the opposite being so fidgety that others notice Not at all   Thoughts of being better off dead, or hurting yourself in some way Not at all   PHQ 9 Score 15   How difficult have these problems made it for you to do your work, take care of your home and get along with others Very difficult     Fall Risk Assessment:     Fall Risk Assessment, last 12 mths 2021 Able to walk? Yes   Fall in past 12 months? 0   Do you feel unsteady? 0   Are you worried about falling 0     Abuse Screening:     Abuse Screening Questionnaire 2/4/2021   Do you ever feel afraid of your partner? N   Are you in a relationship with someone who physically or mentally threatens you? N   Is it safe for you to go home? Y     ADL Assessment:   No flowsheet data found. Coordination of Care Questionaire:   1. Have you been to the ER, urgent care clinic since your last visit? Hospitalized since your last visit? NO    2. Have you seen or consulted any other health care providers outside of the [address] [place] [address] since your last visit? Include any pap smears or colon screening. NO    Advanced Directive:   1. Do you have an Advanced Directive? NO    2. Would you like information on Advanced Directives?  NO

## 2021-05-06 NOTE — PROGRESS NOTES
[address] [place] [address]               [phone]      Desiree Lindquist is a 44 y.o. male who was seen by synchronous (real-time) audio-video technology on 5/6/2021. Consent: Desiree Lindquist, who was seen by synchronous (real-time) audio-video technology, and/or his healthcare decision maker, is aware that this patient-initiated, Telehealth encounter on 5/6/2021 is a billable service, with coverage as determined by his insurance carrier. He is aware that he may receive a bill and has provided verbal consent to proceed: Yes. Assessment & Plan:   Diagnoses and all orders for this visit:    1. Essential hypertension with goal blood pressure less than 097/71  -     METABOLIC PANEL, COMPREHENSIVE; Future  Endorses medication compliance, Have asked him/her to come in for follow-up labs and Blood pressure stable per home checks, continue same medications  2. Mixed hyperlipidemia  -     LIPID PANEL; Future  Endorses medication compliance, Have asked him/her to come in for follow-up labs and Denies abdominal pain or symptoms of myalgia  3. Dysthymia  Endorses medication compliance and States his symptoms are well controlled on his current medication regimen, will continue the same  4. Prediabetes  -     HEMOGLOBIN A1C WITH EAG; Future  Have asked him/her to come in for follow-up labs    Follow-up and Dispositions    · Return in about 4 months (around 9/6/2021) for HLD, HTN, depression, 15 min, office only, AND, labs prior.              712  Subjective:     Health Maintenance Due   Topic Date Due    COVID-19 Vaccine (1) Never done    Shingrix Vaccine Age 50> (1 of 2) Never done    Colorectal Cancer Screening Combo  01/01/2020    A1C test (Diabetic or Prediabetic)  03/13/2020             Desiree Lindquist is a 44 y.o. male who was seen for   Hypertension, Cholesterol Problem, and Depression    Colon polyp  Did not get colonoscopy completed, day prior his mother broke her hip  He has been caring for her, he lives with her and is her primary caregiver  Has not rescheduled at this time  Denies changes in stool, abdominal pain, unexplained weight loss, night sweats    Hypertension:   Patient reports taking medications as instructed. yes   Medication side effects noted. no  Headache upon wakening. no   Home BP monitoring in range of 107/76. Do you experience chest pain/pressure or SOB with exertion? no  Maintain a low salt diet? yes  Key CAD CHF Meds             aspirin delayed-release (Aspirin Low Dose) 81 mg tablet (Taking) Take 81 mg by mouth daily. amLODIPine (NORVASC) 5 mg tablet (Taking) TAKE 1 TABLET BY MOUTH EVERY DAY    metoprolol tartrate (LOPRESSOR) 100 mg IR tablet (Taking) TAKE 1 TABLET BY MOUTH TWICE A DAY    losartan-hydroCHLOROthiazide (HYZAAR) 50-12.5 mg per tablet (Taking) TAKE 1 TABLET BY MOUTH EVERY DAY    pravastatin (PRAVACHOL) 40 mg tablet (Taking) TAKE 1 TABLET BY MOUTH EVERY DAY AT NIGHT        HLD:  Has been compliant with meds  all of the time  Compliant with low-fat diet. most of the time    Denies myalgias or other side effects. yes  Cholesterol, total   Date Value Ref Range Status   12/28/2020 216 (H) 100 - 199 mg/dL Final     Triglyceride   Date Value Ref Range Status   12/28/2020 113 0 - 149 mg/dL Final     HDL Cholesterol   Date Value Ref Range Status   12/28/2020 61 >39 mg/dL Final     LDL, calculated   Date Value Ref Range Status   12/28/2020 135 (H) 0 - 99 mg/dL Final   ]  Key Antihyperlipidemia Meds             pravastatin (PRAVACHOL) 40 mg tablet (Taking) TAKE 1 TABLET BY MOUTH EVERY DAY AT NIGHT       Depression Review:  Patient is seen for followup of depression. Treatment includes SSRI, Wellbutrin, trazodone, cymbalta and no other therapies. Ongoing symptoms include depressed mood. He denies insomnia, hypersomnia, feelings of worthlessness/guilt, hopelessness, recurrent thoughts of death and suicidal thoughts without plan.    He experiences the following side effects from the treatment: none. Endorses med compliance, is happy with current medication regimen, does not want to make any changes    Prior to Admission medications    Medication Sig Start Date End Date Taking? Authorizing Provider   aspirin delayed-release (Aspirin Low Dose) 81 mg tablet Take 81 mg by mouth daily.  4/1/21  Yes Provider, Historical   amLODIPine (NORVASC) 5 mg tablet TAKE 1 TABLET BY MOUTH EVERY DAY 4/27/21  Yes Warren Ferguson NP   metoprolol tartrate (LOPRESSOR) 100 mg IR tablet TAKE 1 TABLET BY MOUTH TWICE A DAY 4/27/21  Yes Warren Ferguson NP   losartan-hydroCHLOROthiazide (HYZAAR) 50-12.5 mg per tablet TAKE 1 TABLET BY MOUTH EVERY DAY 4/26/21  Yes Warren Ferguson NP   pravastatin (PRAVACHOL) 40 mg tablet TAKE 1 TABLET BY MOUTH EVERY DAY AT NIGHT 4/26/21  Yes Warren Ferguson NP   buPROPion XL (WELLBUTRIN XL) 300 mg XL tablet TAKE 1 TABLET BY MOUTH EVERY DAY IN THE MORNING 4/26/21  Yes Warren Ferguson NP   traZODone (DESYREL) 100 mg tablet TAKE 1 TABLET BY MOUTH EVERY DAY EVERY NIGHT 4/26/21  Yes Warren Ferguson NP   DULoxetine (CYMBALTA) 60 mg capsule TAKE 1 CAPSULE BY MOUTH EVERY DAY 4/26/21  Yes Warren Ferguson NP     Allergies   Allergen Reactions    Lisinopril Cough       Patient Active Problem List   Diagnosis Code    Essential hypertension with goal blood pressure less than 140/90 I10    Hyperlipidemia E78.5    Dysthymia F34.1    Precancerous lesion D49.9    Severe obesity ([address] E66.01    DOLNA (nonalcoholic steatohepatitis) K75.81    Prediabetes R73.03    History of alcohol abuse F10.11     Past Surgical History:   Procedure Laterality Date    HX COLONOSCOPY Bilateral 2018    Dr. Mary Massey      Family History   Problem Relation Age of Onset    Hypertension Mother     Arthritis-osteo Mother     Cancer Father         pancreatic    Heart Disease Father 61        CABG    Arthritis-rheumatoid Father     Arthritis-osteo Father     Hypertension Father     Psychiatric Disorder Father         depression    Hypertension Sister     Hypertension Brother     Heart Disease Brother 48        CABG    Migraines Sister      Social History     Tobacco Use    Smoking status: Never Smoker    Smokeless tobacco: Never Used   Substance Use Topics    Alcohol use: Not Currently     Alcohol/week: 16.7 standard drinks     Types: 20 Standard drinks or equivalent per week     Comment: 4-5 days a week       ROS  As stated in HPI, otherwise all others negative. Objective: There were no vitals taken for this visit. General: alert, cooperative, no distress   Mental  status: normal mood, behavior, speech, dress, motor activity, and thought processes, able to follow commands   HENT: NCAT   Neck: no visualized mass   Resp: no respiratory distress   Neuro: no gross deficits   Skin: no discoloration or lesions of concern on visible areas   Psychiatric: normal affect, consistent with stated mood, no evidence of hallucinations     Additional exam findings: We discussed the expected course, resolution and complications of the diagnosis(es) in detail. Medication risks, benefits, costs, interactions, and alternatives were discussed as indicated. I advised him to contact the office if his condition worsens, changes or fails to improve as anticipated. He expressed understanding with the diagnosis(es) and plan. Lelia Salazar is a 44 y.o. male who was evaluated by a video visit encounter for concerns as above. Patient identification was verified prior to start of the visit. A caregiver was present when appropriate. Due to this being a TeleHealth encounter (During [facility]-20 public health emergency), evaluation of the following organ systems was limited: Vitals/Constitutional/EENT/Resp/CV/GI//MS/Neuro/Skin/Heme-Lymph-Imm.   Pursuant to the emergency declaration under the [facility], 1135 waiver authority and the Coronavirus Preparedness and Response Supplemental Appropriations Act, this Virtual  Visit was conducted, with patient's (and/or legal guardian's) consent, to reduce the patient's risk of exposure to COVID-19 and provide necessary medical care. Services were provided through a video synchronous discussion virtually to substitute for in-person clinic visit. Patient and provider were located at their individual homes. An After Visit Summary was printed and given to the patient. All diagnosis have been discussed with the patient and all of the patient's questions have been answered. Follow-up and Dispositions    · Return in about 4 months (around 9/6/2021) for HLD, HTN, depression, 15 min, office only, AND, labs prior. Lilibeth Torres, [place] [id number] [address]   Torres Hernandez

## 2021-07-06 ENCOUNTER — PATIENT MESSAGE (OUTPATIENT)
Dept: FAMILY MEDICINE CLINIC | Age: 51
End: 2021-07-06

## 2021-07-09 NOTE — TELEPHONE ENCOUNTER
From: Skyler Cespedes  To: [facility]  Sent: 7/6/2021 2:25 PM EDT  Subject: Non-Urgent Medical Question    I am writing to follow up on the below message I sent 6/30. Thank you. #########  Michelle Ms. Kiesha Lucas,     I have been experiencing some red, slightly raised spots on my skin for the past 3-4 months which have recently become more prevalent. They are generally the size of a pencil eraser tip, appear on various parts of my body (feet, arms, thighs, abdomen, legs) and eventually fade and flake off. They do not itch. I also have fairly severe scaling/flaking on my scalp. It has gotten worse recently and itches constantly. Are you able to prescribe something for these problems without a visit? If not, would a virtual visit suffice?      Thank you,  Skyler Cespedes

## 2021-10-29 ENCOUNTER — APPOINTMENT (OUTPATIENT)
Dept: FAMILY MEDICINE CLINIC | Age: 51
End: 2021-10-29

## 2021-10-29 DIAGNOSIS — E78.2 MIXED HYPERLIPIDEMIA: ICD-10-CM

## 2021-10-29 DIAGNOSIS — I10 ESSENTIAL HYPERTENSION WITH GOAL BLOOD PRESSURE LESS THAN 140/90: ICD-10-CM

## 2021-10-29 DIAGNOSIS — R73.03 PREDIABETES: ICD-10-CM

## 2021-10-30 LAB
ALBUMIN SERPL-MCNC: 4.8 G/DL (ref 3.8–4.9)
ALBUMIN/GLOB SERPL: 1.6 {RATIO} (ref 1.2–2.2)
ALP SERPL-CCNC: 86 IU/L (ref 44–121)
ALT SERPL-CCNC: 21 IU/L (ref 0–44)
AST SERPL-CCNC: 29 IU/L (ref 0–40)
BILIRUB SERPL-MCNC: 0.9 MG/DL (ref 0–1.2)
BUN SERPL-MCNC: 18 MG/DL (ref 6–24)
BUN/CREAT SERPL: 23 (ref 9–20)
CALCIUM SERPL-MCNC: 10.1 MG/DL (ref 8.7–10.2)
CHLORIDE SERPL-SCNC: 97 MMOL/L (ref 96–106)
CHOLEST SERPL-MCNC: 205 MG/DL (ref 100–199)
CO2 SERPL-SCNC: 25 MMOL/L (ref 20–29)
CREAT SERPL-MCNC: 0.79 MG/DL (ref 0.76–1.27)
EST. AVERAGE GLUCOSE BLD GHB EST-MCNC: 108 MG/DL
GLOBULIN SER CALC-MCNC: 3 G/DL (ref 1.5–4.5)
GLUCOSE SERPL-MCNC: 101 MG/DL (ref 65–99)
HBA1C MFR BLD: 5.4 % (ref 4.8–5.6)
HDLC SERPL-MCNC: 96 MG/DL
IMP & REVIEW OF LAB RESULTS: NORMAL
LDLC SERPL CALC-MCNC: 97 MG/DL (ref 0–99)
POTASSIUM SERPL-SCNC: 4.4 MMOL/L (ref 3.5–5.2)
PROT SERPL-MCNC: 7.8 G/DL (ref 6–8.5)
SODIUM SERPL-SCNC: 139 MMOL/L (ref 134–144)
TRIGL SERPL-MCNC: 69 MG/DL (ref 0–149)
VLDLC SERPL CALC-MCNC: 12 MG/DL (ref 5–40)

## 2021-11-05 ENCOUNTER — VIRTUAL VISIT (OUTPATIENT)
Dept: FAMILY MEDICINE CLINIC | Age: 51
End: 2021-11-05
Payer: MEDICAID

## 2021-11-05 DIAGNOSIS — I10 ESSENTIAL HYPERTENSION WITH GOAL BLOOD PRESSURE LESS THAN 140/90: Primary | ICD-10-CM

## 2021-11-05 DIAGNOSIS — Z12.11 COLON CANCER SCREENING: ICD-10-CM

## 2021-11-05 DIAGNOSIS — D49.9 PRECANCEROUS LESION: ICD-10-CM

## 2021-11-05 DIAGNOSIS — E78.2 MIXED HYPERLIPIDEMIA: ICD-10-CM

## 2021-11-05 DIAGNOSIS — F33.1 MODERATE EPISODE OF RECURRENT MAJOR DEPRESSIVE DISORDER (HCC): ICD-10-CM

## 2021-11-05 DIAGNOSIS — R73.03 PREDIABETES: ICD-10-CM

## 2021-11-05 PROCEDURE — 99214 OFFICE O/P EST MOD 30 MIN: CPT | Performed by: NURSE PRACTITIONER

## 2021-11-05 NOTE — PROGRESS NOTES
1st attempt calling patient at 10:12 AM for precheck in for appointment . Patient did not answer . Left message stating to call office back.      2nd attempt     3rd attempt

## 2021-11-05 NOTE — PROGRESS NOTES
[address] [place] [address]               [phone]      Laly Cruz is a 46 y.o. male who was seen by synchronous (real-time) audio-video technology on 11/5/2021. Consent: Laly Cruz, who was seen by synchronous (real-time) audio-video technology, and/or his healthcare decision maker, is aware that this patient-initiated, Telehealth encounter on 11/5/2021 is a billable service, with coverage as determined by his insurance carrier. He is aware that he may receive a bill and has provided verbal consent to proceed: Yes. Assessment & Plan:   Diagnoses and all orders for this visit:    1. Essential hypertension with goal blood pressure less than 319/71  -     METABOLIC PANEL, COMPREHENSIVE; Future  Endorses medication compliance, Follow up labs prior to next visit and Blood pressure stable per home checks, continue same medications   2. Mixed hyperlipidemia  -     LIPID PANEL; Future  Endorses medication compliance, Follow up labs prior to next visit and Denies abdominal pain or symptoms of myalgia   3. Moderate episode of recurrent major depressive disorder ([facility] [address]  Endorses medication compliance and He is not interested in seeing a psychiatrist, he has been on this regimen for years, has tried other medications in the past, he would like to continue with his current regimen.    3 most recent PHQ Screens 11/5/2021   PHQ Not Done -   Little interest or pleasure in doing things More than half the days   Feeling down, depressed, irritable, or hopeless Nearly every day   Total Score PHQ 2 5   Trouble falling or staying asleep, or sleeping too much Not at all   Feeling tired or having little energy More than half the days   Poor appetite, weight loss, or overeating Not at all   Feeling bad about yourself - or that you are a failure or have let yourself or your family down More than half the days   Trouble concentrating on things such as school, work, reading, or watching TV More than half the days   Moving or speaking so slowly that other people could have noticed; or the opposite being so fidgety that others notice Several days   Thoughts of being better off dead, or hurting yourself in some way Not at all   PHQ 9 Score 12   How difficult have these problems made it for you to do your work, take care of your home and get along with others Very difficult        4. Prediabetes  -     HEMOGLOBIN A1C WITH EAG; Future  Follow up labs prior to next visit and he has purposfully lost 50 pounds over the past year, A1C 5.4%, continue to monitor   5. Precancerous lesion  -     REFERRAL TO GASTROENTEROLOGY  Health maintenance needs addressed   6. Colon cancer screening  -     REFERRAL TO GASTROENTEROLOGY      Follow-up and Dispositions    · Return in about 4 months (around 3/5/2022) for HTN, HLD, DM, 15 min, office only, with, labs prior. Colon cancer screening: he needs to reschedule, was scheduled with Dr. Chris Dent DLDS      712  Subjective:     Health Maintenance Due   Topic Date Due    COVID-19 Vaccine (1) Never done    Shingrix Vaccine Age 50> (1 of 2) Never done    Colorectal Cancer Screening Combo  01/01/2020    Flu Vaccine (1) 09/01/2021             Corinna Castillo is a 46 y.o. male who was seen for   No chief complaint on file. Depression Review:  Patient is seen for followup of depression. Treatment includes Wellbutrin, cymbalta, trazodone and no other therapies. Ongoing symptoms include depressed mood, feelings of worthlessness/guilt and hopelessness. He denies recurrent thoughts of death, suicidal thoughts without plan and suicidal thoughts with specific plan. He experiences the following side effects from the treatment: none. Hypertension:  Has lost 50 pounds over the past year. Patient reports taking medications as instructed. yes   Medication side effects noted. no  Headache upon wakening.  no   Home BP monitoring in range of 110/70    Do you experience chest pain/pressure or SOB with exertion? no  Maintain a low Sodium diet? no  Key CAD CHF Meds             losartan-hydroCHLOROthiazide (HYZAAR) 50-12.5 mg per tablet (Taking) TAKE 1 TABLET BY MOUTH EVERY DAY    amLODIPine (NORVASC) 5 mg tablet (Taking) TAKE 1 TABLET BY MOUTH EVERY DAY    metoprolol tartrate (LOPRESSOR) 100 mg IR tablet (Taking) TAKE 1 TABLET BY MOUTH TWICE A DAY    pravastatin (PRAVACHOL) 40 mg tablet (Taking) TAKE 1 TABLET BY MOUTH EVERY DAY AT NIGHT    aspirin delayed-release (Aspirin Low Dose) 81 mg tablet (Taking) Take 81 mg by mouth daily. BUN   Date Value Ref Range Status   10/29/2021 18 6 - 24 mg/dL Final     Creatinine   Date Value Ref Range Status   10/29/2021 0.79 0.76 - 1.27 mg/dL Final     GFR est AA   Date Value Ref Range Status   10/29/2021 119 >59 mL/min/1.73 Final     Comment:     **In accordance with recommendations from the NKF-ASN Task force,**    [facility] eGFR calculation to the    2021 CKD-EPI creatinine equation that estimates kidney function    without a race variable. Potassium   Date Value Ref Range Status   10/29/2021 4.4 3.5 - 5.2 mmol/L Final     HLD:  Has been compliant with meds  Yes  Compliant with low-fat diet. most of the time    Denies myalgias or other side effects. yes  The 10-year ASCVD risk score (Benjamin López, et al., 2013) is: 2.9%    Cholesterol, total   Date Value Ref Range Status   10/29/2021 205 (H) 100 - 199 mg/dL Final     Triglyceride   Date Value Ref Range Status   10/29/2021 69 0 - 149 mg/dL Final     HDL Cholesterol   Date Value Ref Range Status   10/29/2021 96 >39 mg/dL Final     LDL, calculated   Date Value Ref Range Status   10/29/2021 97 0 - 99 mg/dL Final   ]  Key Antihyperlipidemia Meds             pravastatin (PRAVACHOL) 40 mg tablet (Taking) TAKE 1 TABLET BY MOUTH EVERY DAY AT NIGHT             Prior to Admission medications    Medication Sig Start Date End Date Taking?  Authorizing Provider losartan-hydroCHLOROthiazide (HYZAAR) 50-12.5 mg per tablet TAKE 1 TABLET BY MOUTH EVERY DAY 7/9/21  Yes Jenette Boxer, NP   amLODIPine (NORVASC) 5 mg tablet TAKE 1 TABLET BY MOUTH EVERY DAY 7/9/21  Yes Jenette Boxer, NP   DULoxetine (CYMBALTA) 60 mg capsule TAKE 1 CAPSULE BY MOUTH EVERY DAY 7/9/21  Yes Jenette Boxer, NP   traZODone (DESYREL) 100 mg tablet TAKE 1 TABLET BY MOUTH EVERY DAY EVERY NIGHT 7/9/21  Yes Jenette Boxer, NP   buPROPion XL (WELLBUTRIN XL) 300 mg XL tablet TAKE 1 TABLET BY MOUTH EVERY DAY IN THE MORNING 7/9/21  Yes Jenette Boxer, NP   metoprolol tartrate (LOPRESSOR) 100 mg IR tablet TAKE 1 TABLET BY MOUTH TWICE A DAY 7/9/21  Yes Jenette Boxer, NP   pravastatin (PRAVACHOL) 40 mg tablet TAKE 1 TABLET BY MOUTH EVERY DAY AT NIGHT 7/9/21  Yes Jenette Boxer, NP   aspirin delayed-release (Aspirin Low Dose) 81 mg tablet Take 81 mg by mouth daily.  4/1/21  Yes Provider, Historical     Allergies   Allergen Reactions    Lisinopril Cough       Patient Active Problem List   Diagnosis Code    Essential hypertension with goal blood pressure less than 140/90 I10    Hyperlipidemia E78.5    Moderate episode of recurrent major depressive disorder ([facility] [address] F33.1    Precancerous lesion D49.9    Severe obesity ([facility] [address] E66.01    DOLAN (nonalcoholic steatohepatitis) K75.81    Prediabetes R73.03    History of alcohol abuse F10.11     Past Surgical History:   Procedure Laterality Date    HX COLONOSCOPY Bilateral 2018    Dr. Kit Beltran      Family History   Problem Relation Age of Onset    Hypertension Mother     Arthritis-osteo Mother     Cancer Father         pancreatic    Heart Disease Father 61        CABG   [de-identified] Arthritis-rheumatoid Father     Arthritis-osteo Father     Hypertension Father     Psychiatric Disorder Father         depression    Hypertension Sister     Hypertension Brother     Heart Disease Brother 48        CABG    Migraines Sister      Social History Tobacco Use    Smoking status: Never Smoker    Smokeless tobacco: Never Used   Substance Use Topics    Alcohol use: Not Currently     Alcohol/week: 16.7 standard drinks     Types: 20 Standard drinks or equivalent per week     Comment: 4-5 days a week       ROS  As stated in HPI, otherwise all others negative. Objective: There were no vitals taken for this visit. General: alert, cooperative, no distress   Mental  status: normal mood, behavior, speech, dress, motor activity, and thought processes, able to follow commands   HENT: NCAT   Neck: no visualized mass   Resp: no respiratory distress   Neuro: no gross deficits   Skin: no discoloration or lesions of concern on visible areas   Psychiatric: normal affect, consistent with stated mood, no evidence of hallucinations     Additional exam findings: We discussed the expected course, resolution and complications of the diagnosis(es) in detail. Medication risks, benefits, costs, interactions, and alternatives were discussed as indicated. I advised him to contact the office if his condition worsens, changes or fails to improve as anticipated. He expressed understanding with the diagnosis(es) and plan. Nicci Eugene is a 46 y.o. male who was evaluated by a video visit encounter for concerns as above. Patient identification was verified prior to start of the visit. A caregiver was present when appropriate. Due to this being a TeleHealth encounter (During [facility]- public health emergency), evaluation of the following organ systems was limited: Vitals/Constitutional/EENT/Resp/CV/GI//MS/Neuro/Skin/Heme-Lymph-Imm.   Pursuant to the emergency declaration under the [facility], [facility] waiver authority and the [employer] Act, this Virtual  Visit was conducted, with patient's (and/or legal guardian's) consent, to reduce the patient's risk of exposure to COVID-19 and provide necessary medical care. Services were provided through a video synchronous discussion virtually to substitute for in-person clinic visit. Patient and provider were located at their individual homes. An After Visit Summary was printed and given to the patient. All diagnosis have been discussed with the patient and all of the patient's questions have been answered. Follow-up and Dispositions    · Return in about 4 months (around 3/5/2022) for HTN, HLD, DM, 15 min, office only, with, labs prior. Joette Blizzard, [facility]   Torres Hernandez

## 2021-12-13 DIAGNOSIS — E78.5 HYPERLIPIDEMIA, UNSPECIFIED HYPERLIPIDEMIA TYPE: ICD-10-CM

## 2021-12-13 DIAGNOSIS — F32.A DEPRESSION, UNSPECIFIED DEPRESSION TYPE: ICD-10-CM

## 2021-12-13 DIAGNOSIS — I10 ESSENTIAL HYPERTENSION WITH GOAL BLOOD PRESSURE LESS THAN 140/90: ICD-10-CM

## 2021-12-13 RX ORDER — PRAVASTATIN SODIUM 40 MG/1
40 TABLET ORAL
Qty: 90 TABLET | Refills: 3 | Status: SHIPPED | OUTPATIENT
Start: 2021-12-13

## 2021-12-13 RX ORDER — LOSARTAN POTASSIUM AND HYDROCHLOROTHIAZIDE 12.5; 5 MG/1; MG/1
1 TABLET ORAL DAILY
Qty: 90 TABLET | Refills: 3 | Status: SHIPPED | OUTPATIENT
Start: 2021-12-13

## 2021-12-13 RX ORDER — TRAZODONE HYDROCHLORIDE 100 MG/1
100 TABLET ORAL
Qty: 90 TABLET | Refills: 3 | Status: SHIPPED | OUTPATIENT
Start: 2021-12-13

## 2021-12-13 RX ORDER — BUPROPION HYDROCHLORIDE 300 MG/1
300 TABLET ORAL DAILY
Qty: 90 TABLET | Refills: 3 | Status: SHIPPED | OUTPATIENT
Start: 2021-12-13

## 2021-12-14 DIAGNOSIS — I10 ESSENTIAL HYPERTENSION WITH GOAL BLOOD PRESSURE LESS THAN 140/90: ICD-10-CM

## 2021-12-14 DIAGNOSIS — F32.A DEPRESSION, UNSPECIFIED DEPRESSION TYPE: ICD-10-CM

## 2021-12-14 RX ORDER — DULOXETIN HYDROCHLORIDE 60 MG/1
60 CAPSULE, DELAYED RELEASE ORAL DAILY
Qty: 90 CAPSULE | Refills: 3 | Status: SHIPPED | OUTPATIENT
Start: 2021-12-14

## 2021-12-14 RX ORDER — METOPROLOL TARTRATE 100 MG/1
100 TABLET ORAL 2 TIMES DAILY
Qty: 180 TABLET | Refills: 3 | Status: SHIPPED | OUTPATIENT
Start: 2021-12-14

## 2021-12-14 RX ORDER — AMLODIPINE BESYLATE 5 MG/1
5 TABLET ORAL DAILY
Qty: 90 TABLET | Refills: 3 | Status: SHIPPED | OUTPATIENT
Start: 2021-12-14

## 2022-03-18 PROBLEM — D49.9 PRECANCEROUS LESION: Status: ACTIVE | Noted: 2019-03-13

## 2022-03-19 PROBLEM — E66.01 SEVERE OBESITY (HCC): Status: ACTIVE | Noted: 2019-04-17

## 2022-03-19 PROBLEM — F10.11 HISTORY OF ALCOHOL ABUSE: Status: ACTIVE | Noted: 2021-02-04

## 2022-03-20 PROBLEM — K75.81 NASH (NONALCOHOLIC STEATOHEPATITIS): Status: ACTIVE | Noted: 2021-01-04

## 2022-03-20 PROBLEM — R73.03 PREDIABETES: Status: ACTIVE | Noted: 2021-02-04

## 2023-05-04 ENCOUNTER — E-VISIT (OUTPATIENT)
Facility: CLINIC | Age: 53
End: 2023-05-04

## 2023-05-04 DIAGNOSIS — I10 ESSENTIAL HYPERTENSION WITH GOAL BLOOD PRESSURE LESS THAN 140/90: Primary | ICD-10-CM

## 2023-05-04 PROCEDURE — 99421 OL DIG E/M SVC 5-10 MIN: CPT | Performed by: NURSE PRACTITIONER

## 2023-05-05 NOTE — PROGRESS NOTES
Andrea Olsen (1970) initiated an asynchronous digital communication through [address] [place] [address]. HPI: per patient questionnaire     Exam: not applicable    Diagnoses and all orders for this visit:  Diagnoses and all orders for this visit:    Essential hypertension with goal blood pressure less than 140/90    Advised patient he needs to be seen in office for medication refills as his last visit was 11/2021. Time: EV1 - 5-10 minutes were spent on the digital evaluation and management of this patient.     Edna Martines, FAIZA - CNP